# Patient Record
Sex: MALE | Race: WHITE | NOT HISPANIC OR LATINO | Employment: UNEMPLOYED | URBAN - NONMETROPOLITAN AREA
[De-identification: names, ages, dates, MRNs, and addresses within clinical notes are randomized per-mention and may not be internally consistent; named-entity substitution may affect disease eponyms.]

---

## 2017-01-18 ENCOUNTER — OFFICE VISIT (OUTPATIENT)
Dept: FAMILY MEDICINE CLINIC | Facility: CLINIC | Age: 32
End: 2017-01-18

## 2017-01-18 VITALS
WEIGHT: 153 LBS | DIASTOLIC BLOOD PRESSURE: 68 MMHG | BODY MASS INDEX: 25.49 KG/M2 | HEIGHT: 65 IN | SYSTOLIC BLOOD PRESSURE: 108 MMHG

## 2017-01-18 DIAGNOSIS — R06.83 SNORING: ICD-10-CM

## 2017-01-18 DIAGNOSIS — M79.605 CHRONIC PAIN OF BOTH LOWER EXTREMITIES: Primary | ICD-10-CM

## 2017-01-18 DIAGNOSIS — J06.9 VIRAL URI WITH COUGH: ICD-10-CM

## 2017-01-18 DIAGNOSIS — G89.29 CHRONIC PAIN OF BOTH LOWER EXTREMITIES: Primary | ICD-10-CM

## 2017-01-18 DIAGNOSIS — M79.604 CHRONIC PAIN OF BOTH LOWER EXTREMITIES: Primary | ICD-10-CM

## 2017-01-18 PROCEDURE — 99214 OFFICE O/P EST MOD 30 MIN: CPT | Performed by: FAMILY MEDICINE

## 2017-01-18 RX ORDER — BENZONATATE 100 MG/1
100 CAPSULE ORAL 3 TIMES DAILY PRN
Qty: 21 CAPSULE | Refills: 0 | Status: SHIPPED | OUTPATIENT
Start: 2017-01-18 | End: 2017-04-18

## 2017-01-18 RX ORDER — TRAMADOL HYDROCHLORIDE 50 MG/1
50 TABLET ORAL 3 TIMES DAILY PRN
Qty: 90 TABLET | Refills: 2 | Status: SHIPPED | OUTPATIENT
Start: 2017-01-18 | End: 2017-06-21

## 2017-01-18 RX ORDER — FLUTICASONE PROPIONATE 50 MCG
2 SPRAY, SUSPENSION (ML) NASAL DAILY
Qty: 1 EACH | Refills: 0 | Status: SHIPPED | OUTPATIENT
Start: 2017-01-18 | End: 2017-02-17

## 2017-01-18 RX ORDER — ASPIRIN 81 MG/1
81 TABLET, CHEWABLE ORAL DAILY
COMMUNITY
End: 2017-08-17 | Stop reason: HOSPADM

## 2017-01-18 RX ORDER — ALBUTEROL SULFATE 90 UG/1
2 AEROSOL, METERED RESPIRATORY (INHALATION) EVERY 6 HOURS PRN
Qty: 1 INHALER | Refills: 2 | Status: SHIPPED | OUTPATIENT
Start: 2017-01-18 | End: 2017-08-25

## 2017-01-18 NOTE — PROGRESS NOTES
Subjective   Giacomo Yanez is a 31 y.o. male.     History of Present Illness   Mr. Yanez is a 30yo male that presents today for follow-up on chronic leg pain.  He has seen orthopedic that wants to do surgery but he needs clearance.  EKG abnormal. Missed his appointment with cardiology.  He has been taking tramadol and that seems to be helping his pain a little and helps him.  His wife is concerned that he may have sleep apnea.  Wants to have a sleep study before surgery.  He has snoring problem.  Feels tired all the time.  Never well rested.  Wakes up all night for no reason.  He has had a nonproductive cough.  Has had rhinorrhea and clear drainage.  Hasn't had any fevers.        Current Outpatient Prescriptions:   •  albuterol (PROVENTIL HFA;VENTOLIN HFA) 108 (90 BASE) MCG/ACT inhaler, Inhale 2 puffs Every 6 (Six) Hours As Needed for wheezing., Disp: 1 inhaler, Rfl: 2  •  aspirin 81 MG chewable tablet, Chew 81 mg Daily., Disp: , Rfl:   •  ranitidine (ZANTAC) 150 MG capsule, Take 1 capsule by mouth every evening., Disp: 30 capsule, Rfl: 11  •  traMADol (ULTRAM) 50 MG tablet, Take 1 tablet by mouth 3 (three) times a day as needed for moderate pain (4-6)., Disp: 90 tablet, Rfl: 2    The following portions of the patient's history were reviewed and updated as appropriate: allergies, current medications, past family history, past medical history, past social history, past surgical history and problem list.    Review of Systems   Constitutional: Positive for activity change, appetite change and fatigue. Negative for unexpected weight change.   Respiratory: Negative for cough, chest tightness, shortness of breath, wheezing and stridor.    Cardiovascular: Negative for chest pain, palpitations and leg swelling.   Gastrointestinal: Negative for abdominal distention, constipation, diarrhea, nausea and vomiting.   Musculoskeletal: Positive for arthralgias, back pain and gait problem.   Skin: Negative for pallor, rash and  "wound.   Psychiatric/Behavioral: Positive for sleep disturbance. Negative for dysphoric mood. The patient is not nervous/anxious.        Objective    Vitals:    01/18/17 1316   BP: 108/68   Weight: 153 lb (69.4 kg)   Height: 65\" (165.1 cm)     Physical Exam   Constitutional: He is oriented to person, place, and time. He appears well-developed and well-nourished. No distress.   Cardiovascular: Normal rate, regular rhythm and normal heart sounds.    No murmur heard.  No LE edema.   Pulmonary/Chest: Effort normal and breath sounds normal. No respiratory distress.   Abdominal: Soft. Bowel sounds are normal. He exhibits no distension. There is no tenderness.   Neurological: He is alert and oriented to person, place, and time.   Psychiatric: He has a normal mood and affect. His behavior is normal. Judgment and thought content normal.   Nursing note and vitals reviewed.      Assessment/Plan   Problems Addressed this Visit     None      Visit Diagnoses     Chronic pain of both lower extremities    -  Primary    Relevant Medications    traMADol (ULTRAM) 50 MG tablet    Snoring        Relevant Orders    Ambulatory Referral to Sleep Medicine    Viral URI with cough        Relevant Medications    fluticasone (FLONASE) 50 MCG/ACT nasal spray    benzonatate (TESSALON PERLES) 100 MG capsule        1.) Back Pain-  Continue tramadol. Needs to get his cardiac clearance.  Will reschedule.  UDS at next appointment. The patient has read and signed the [facility] Controlled Substance Contract.  I will continue to see patient for regular follow up appointments.  They are well controlled on their medication.  NAHID has been reviewed by me and is updated every 3 months. The patient is aware of the potential for addiction and dependence.  2.) Snoring-  Refererd to sleep medicine for DEB evaluation.  3.) Viral URI-  Treat symptoms and start flonase.  RTC in 3 months or sooner PRN           "

## 2017-01-18 NOTE — MR AVS SNAPSHOT
Giacomo Yanez   1/18/2017 2:45 PM   Office Visit    Dept Phone:  [phone]   Encounter #:  [id number]    Provider:  Simran Rosas MD   Department:  [facility]                Your Full Care Plan              Today's Medication Changes          These changes are accurate as of: 1/18/17  2:21 PM.  If you have any questions, ask your nurse or doctor.               New Medication(s)Ordered:     benzonatate 100 MG capsule   Commonly known as:  TESSALON PERLES   Take 1 capsule by mouth 3 (Three) Times a Day As Needed for cough.   Started by:  Simran Rosas MD       fluticasone 50 MCG/ACT nasal spray   Commonly known as:  FLONASE   2 sprays into each nostril Daily for 30 days.   Started by:  Simran Rosas MD         Medication(s)that have changed:     albuterol 108 (90 BASE) MCG/ACT inhaler   Commonly known as:  PROVENTIL HFA;VENTOLIN HFA   Inhale 2 puffs Every 6 (Six) Hours As Needed for wheezing.   What changed:  reasons to take this   Changed by:  Simran Rosas MD            Where to Get Your Medications      These medications were sent to [address] - [phone] [phone] [place] [id number]     Phone:  [phone]     albuterol 108 (90 BASE) MCG/ACT inhaler    benzonatate 100 MG capsule    fluticasone 50 MCG/ACT nasal spray         You can get these medications from any pharmacy     Bring a paper prescription for each of these medications     traMADol 50 MG tablet                  Your Updated Medication List          This list is accurate as of: 1/18/17  2:21 PM.  Always use your most recent med list.                albuterol 108 (90 BASE) MCG/ACT inhaler   Commonly known as:  PROVENTIL HFA;VENTOLIN HFA   Inhale 2 puffs Every 6 (Six) Hours As Needed for wheezing.       aspirin 81 MG chewable tablet       benzonatate 100 MG capsule    Commonly known as:  TESSALON PERLES   Take 1 capsule by mouth 3 (Three) Times a Day As Needed for cough.       fluticasone 50 MCG/ACT nasal spray   Commonly known as:  FLONASE   2 sprays into each nostril Daily for 30 days.       ranitidine 150 MG capsule   Commonly known as:  ZANTAC   Take 1 capsule by mouth every evening.       traMADol 50 MG tablet   Commonly known as:  ULTRAM   Take 1 tablet by mouth 3 (Three) Times a Day As Needed for moderate pain (4-6).               We Performed the Following     Ambulatory Referral to Sleep Medicine       You Were Diagnosed With        Codes Comments    Chronic pain of both lower extremities    -  Primary ICD-10-CM: M79.604, M79.605, G89.29  ICD-9-CM: 729.5, 338.29     Snoring     ICD-10-CM: R06.83  ICD-9-CM: 786.09     Viral URI with cough     ICD-10-CM: J06.9, B97.89  ICD-9-CM: 465.9       Instructions     None    Patient Instructions History      Upcoming Appointments     Visit Type Date Time Department    OFFICE VISIT 2017  2:45 PM [facility]    OFFICE VISIT 2017  1:30 PM [facility]      [facility]     [facility] bMenu allows you to send messages to your doctor, view your test results, renew your prescriptions, schedule appointments, and more. To sign up, go to [place] and click on the Sign Up Now link in the New User? box. Enter your bMenu Activation Code exactly as it appears below along with the last four digits of your Social Security Number and your Date of Birth () to complete the sign-up process. If you do not sign up before the expiration date, you must request a new code.    bMenu Activation Code: [email or web address]  Expires: 2017  2:21 PM    If you have questions, you can email [email or web address] or call [phone] to talk to our bMenu staff. Remember, bMenu is NOT to be used for urgent needs. For medical emergencies, dial 911.               Other Info from Your Visit          "  Your Appointments     Jan 18, 2017  2:45 PM CST   Office Visit with Simran Rosas MD   [facility] (--)    [address]  [address] [place] [id number] [phone]           Arrive 15 minutes prior to appointment.            Apr 18, 2017  1:30 PM CDT   Office Visit with Simran Rosas MD   [facility] (--)    [address]  [address] [place] [id number] [phone]           Arrive 15 minutes prior to appointment.              Allergies     No Known Allergies      Reason for Visit     Follow-up chronic leg pain      Vital Signs     Blood Pressure Height Weight Body Mass Index Smoking Status       108/68 65\" (165.1 cm) 153 lb (69.4 kg) 25.46 kg/m2 Never Smoker       Problems and Diagnoses Noted     Chronic pain of both lower extremities    -  Primary    Snoring        Viral upper respiratory tract infection            "

## 2017-02-23 DIAGNOSIS — R94.31 ABNORMAL ELECTROCARDIOGRAM (ECG) (EKG): Primary | ICD-10-CM

## 2017-03-01 ENCOUNTER — HOSPITAL ENCOUNTER (EMERGENCY)
Facility: HOSPITAL | Age: 32
Discharge: HOME | End: 2017-03-02
Attending: EMERGENCY MEDICINE | Admitting: EMERGENCY MEDICINE

## 2017-03-01 DIAGNOSIS — J40 BRONCHITIS: Primary | ICD-10-CM

## 2017-03-01 LAB
BACTERIA UR QL AUTO: ABNORMAL /HPF
BILIRUB UR QL STRIP: ABNORMAL
CLARITY UR: CLEAR
COLOR UR: ABNORMAL
GLUCOSE UR STRIP-MCNC: NEGATIVE MG/DL
HGB UR QL STRIP.AUTO: ABNORMAL
HYALINE CASTS UR QL AUTO: ABNORMAL /LPF
KETONES UR QL STRIP: NEGATIVE
LEUKOCYTE ESTERASE UR QL STRIP.AUTO: NEGATIVE
NITRITE UR QL STRIP: NEGATIVE
PH UR STRIP.AUTO: 5.5 [PH] (ref 5–9)
PROT UR QL STRIP: ABNORMAL
RBC # UR: ABNORMAL /HPF
REF LAB TEST METHOD: ABNORMAL
SP GR UR STRIP: 1.03 (ref 1–1.03)
SQUAMOUS #/AREA URNS HPF: ABNORMAL /HPF
UROBILINOGEN UR QL STRIP: ABNORMAL
WBC UR QL AUTO: ABNORMAL /HPF

## 2017-03-01 PROCEDURE — 96361 HYDRATE IV INFUSION ADD-ON: CPT

## 2017-03-01 PROCEDURE — 80053 COMPREHEN METABOLIC PANEL: CPT | Performed by: EMERGENCY MEDICINE

## 2017-03-01 PROCEDURE — 96374 THER/PROPH/DIAG INJ IV PUSH: CPT

## 2017-03-01 PROCEDURE — 81001 URINALYSIS AUTO W/SCOPE: CPT | Performed by: EMERGENCY MEDICINE

## 2017-03-01 PROCEDURE — 85025 COMPLETE CBC W/AUTO DIFF WBC: CPT | Performed by: EMERGENCY MEDICINE

## 2017-03-01 PROCEDURE — 99283 EMERGENCY DEPT VISIT LOW MDM: CPT

## 2017-03-01 PROCEDURE — 87086 URINE CULTURE/COLONY COUNT: CPT | Performed by: EMERGENCY MEDICINE

## 2017-03-02 ENCOUNTER — APPOINTMENT (OUTPATIENT)
Dept: GENERAL RADIOLOGY | Facility: HOSPITAL | Age: 32
End: 2017-03-02

## 2017-03-02 VITALS
TEMPERATURE: 97.5 F | OXYGEN SATURATION: 97 % | BODY MASS INDEX: 26.99 KG/M2 | DIASTOLIC BLOOD PRESSURE: 64 MMHG | HEIGHT: 65 IN | RESPIRATION RATE: 18 BRPM | HEART RATE: 109 BPM | SYSTOLIC BLOOD PRESSURE: 119 MMHG | WEIGHT: 162 LBS

## 2017-03-02 LAB
ALBUMIN SERPL-MCNC: 3.9 G/DL (ref 3.4–4.8)
ALBUMIN/GLOB SERPL: 1.3 G/DL (ref 1.1–1.8)
ALP SERPL-CCNC: 110 U/L (ref 38–126)
ALT SERPL W P-5'-P-CCNC: 35 U/L (ref 21–72)
ANION GAP SERPL CALCULATED.3IONS-SCNC: 9 MMOL/L (ref 5–15)
AST SERPL-CCNC: 19 U/L (ref 17–59)
BASOPHILS # BLD AUTO: 0.01 10*3/MM3 (ref 0–0.2)
BASOPHILS NFR BLD AUTO: 0.1 % (ref 0–2)
BILIRUB SERPL-MCNC: 0.4 MG/DL (ref 0.2–1.3)
BUN BLD-MCNC: 14 MG/DL (ref 7–21)
BUN/CREAT SERPL: 12.3 (ref 7–25)
CALCIUM SPEC-SCNC: 8.8 MG/DL (ref 8.4–10.2)
CHLORIDE SERPL-SCNC: 103 MMOL/L (ref 95–110)
CO2 SERPL-SCNC: 29 MMOL/L (ref 22–31)
CREAT BLD-MCNC: 1.14 MG/DL (ref 0.7–1.3)
DEPRECATED RDW RBC AUTO: 41.6 FL (ref 35.1–43.9)
EOSINOPHIL # BLD AUTO: 0.16 10*3/MM3 (ref 0–0.7)
EOSINOPHIL NFR BLD AUTO: 1.9 % (ref 0–7)
ERYTHROCYTE [DISTWIDTH] IN BLOOD BY AUTOMATED COUNT: 13.3 % (ref 11.5–14.5)
GFR SERPL CREATININE-BSD FRML MDRD: 74 ML/MIN/1.73 (ref 70–162)
GLOBULIN UR ELPH-MCNC: 3.1 GM/DL (ref 2.3–3.5)
GLUCOSE BLD-MCNC: 110 MG/DL (ref 60–100)
HCT VFR BLD AUTO: 41.2 % (ref 39–49)
HGB BLD-MCNC: 13.9 G/DL (ref 13.7–17.3)
HOLD SPECIMEN: NORMAL
HOLD SPECIMEN: NORMAL
IMM GRANULOCYTES # BLD: 0.02 10*3/MM3 (ref 0–0.02)
IMM GRANULOCYTES NFR BLD: 0.2 % (ref 0–0.5)
LYMPHOCYTES # BLD AUTO: 2.07 10*3/MM3 (ref 0.6–4.2)
LYMPHOCYTES NFR BLD AUTO: 24.1 % (ref 10–50)
MCH RBC QN AUTO: 28.7 PG (ref 26.5–34)
MCHC RBC AUTO-ENTMCNC: 33.7 G/DL (ref 31.5–36.3)
MCV RBC AUTO: 85.1 FL (ref 80–98)
MONOCYTES # BLD AUTO: 0.7 10*3/MM3 (ref 0–0.9)
MONOCYTES NFR BLD AUTO: 8.1 % (ref 0–12)
NEUTROPHILS # BLD AUTO: 5.63 10*3/MM3 (ref 2–8.6)
NEUTROPHILS NFR BLD AUTO: 65.6 % (ref 37–80)
PLATELET # BLD AUTO: 231 10*3/MM3 (ref 150–450)
PMV BLD AUTO: 11 FL (ref 8–12)
POTASSIUM BLD-SCNC: 4.1 MMOL/L (ref 3.5–5.1)
PROT SERPL-MCNC: 7 G/DL (ref 6.3–8.6)
RBC # BLD AUTO: 4.84 10*6/MM3 (ref 4.37–5.74)
SODIUM BLD-SCNC: 141 MMOL/L (ref 137–145)
WBC NRBC COR # BLD: 8.59 10*3/MM3 (ref 3.2–9.8)
WHOLE BLOOD HOLD SPECIMEN: NORMAL
WHOLE BLOOD HOLD SPECIMEN: NORMAL

## 2017-03-02 PROCEDURE — 25010000002 ONDANSETRON PER 1 MG: Performed by: EMERGENCY MEDICINE

## 2017-03-02 PROCEDURE — 96374 THER/PROPH/DIAG INJ IV PUSH: CPT

## 2017-03-02 PROCEDURE — 71020 HC CHEST PA AND LATERAL: CPT

## 2017-03-02 PROCEDURE — 63710000001 PREDNISONE PER 5 MG: Performed by: EMERGENCY MEDICINE

## 2017-03-02 PROCEDURE — 96361 HYDRATE IV INFUSION ADD-ON: CPT

## 2017-03-02 RX ORDER — IPRATROPIUM BROMIDE AND ALBUTEROL SULFATE 2.5; .5 MG/3ML; MG/3ML
3 SOLUTION RESPIRATORY (INHALATION) ONCE
Status: COMPLETED | OUTPATIENT
Start: 2017-03-02 | End: 2017-03-02

## 2017-03-02 RX ORDER — PREDNISONE 20 MG/1
60 TABLET ORAL DAILY
Qty: 15 TABLET | Refills: 0 | Status: SHIPPED | OUTPATIENT
Start: 2017-03-02 | End: 2017-04-18

## 2017-03-02 RX ORDER — PREDNISONE 20 MG/1
60 TABLET ORAL ONCE
Status: COMPLETED | OUTPATIENT
Start: 2017-03-02 | End: 2017-03-02

## 2017-03-02 RX ORDER — ONDANSETRON 2 MG/ML
4 INJECTION INTRAMUSCULAR; INTRAVENOUS ONCE
Status: COMPLETED | OUTPATIENT
Start: 2017-03-02 | End: 2017-03-02

## 2017-03-02 RX ORDER — AZITHROMYCIN 250 MG/1
250 TABLET, FILM COATED ORAL DAILY
Qty: 4 TABLET | Refills: 0 | Status: SHIPPED | OUTPATIENT
Start: 2017-03-02 | End: 2017-03-06

## 2017-03-02 RX ORDER — AZITHROMYCIN 250 MG/1
500 TABLET, FILM COATED ORAL ONCE
Status: COMPLETED | OUTPATIENT
Start: 2017-03-02 | End: 2017-03-02

## 2017-03-02 RX ADMIN — IPRATROPIUM BROMIDE AND ALBUTEROL SULFATE 3 ML: .5; 3 SOLUTION RESPIRATORY (INHALATION) at 01:21

## 2017-03-02 RX ADMIN — SODIUM CHLORIDE 1000 ML: 9 INJECTION, SOLUTION INTRAVENOUS at 01:21

## 2017-03-02 RX ADMIN — ONDANSETRON 4 MG: 2 INJECTION, SOLUTION INTRAMUSCULAR; INTRAVENOUS at 01:21

## 2017-03-02 RX ADMIN — PREDNISONE 60 MG: 20 TABLET ORAL at 01:21

## 2017-03-02 RX ADMIN — AZITHROMYCIN 500 MG: 250 TABLET, FILM COATED ORAL at 02:13

## 2017-03-02 NOTE — ED PROVIDER NOTES
Subjective   Patient is a 31 y.o. male presenting with cough.   History provided by:  Patient  Cough   Cough characteristics:  Productive  Sputum characteristics:  Green and yellow  Severity:  Moderate  Onset quality:  Gradual  Duration:  2 weeks  Timing:  Constant  Progression:  Worsening  Chronicity:  New  Smoker: no    Relieved by:  Nothing  Worsened by:  Nothing  Ineffective treatments:  Beta-agonist inhaler and cough suppressants  Associated symptoms: ear fullness, ear pain and sore throat    Associated symptoms: no chest pain, no chills, no fever, no headaches, no rash, no rhinorrhea, no shortness of breath and no wheezing    Ear pain:     Location:  Bilateral    Severity:  Mild    Onset quality:  Gradual    Timing:  Intermittent    Progression:  Waxing and waning  Sore throat:     Onset quality:  Gradual    Timing:  Intermittent    Progression:  Waxing and waning      Review of Systems   Constitutional: Negative for activity change, chills and fever.   HENT: Positive for ear pain and sore throat. Negative for congestion, facial swelling, rhinorrhea and sinus pressure.    Eyes: Negative for photophobia and visual disturbance.   Respiratory: Positive for cough. Negative for chest tightness, shortness of breath and wheezing.    Cardiovascular: Negative for chest pain.   Gastrointestinal: Negative for abdominal distention, abdominal pain, diarrhea, nausea and vomiting.   Endocrine: Negative for polyuria.   Genitourinary: Negative for flank pain.   Musculoskeletal: Negative for back pain, joint swelling and neck pain.   Skin: Negative for rash.   Neurological: Negative for seizures, numbness and headaches.   Hematological: Negative for adenopathy.   Psychiatric/Behavioral: Negative for agitation.       Past Medical History   Diagnosis Date   • Astigmatism    • Bone disease    • Chronic pain    • Fibroma of lower extremity    • Myopia        No Known Allergies    History reviewed. No pertinent past surgical  history.    History reviewed. No pertinent family history.    Social History     Social History   • Marital status:      Spouse name: N/A   • Number of children: N/A   • Years of education: N/A     Social History Main Topics   • Smoking status: Never Smoker   • Smokeless tobacco: Never Used   • Alcohol use No   • Drug use: No   • Sexual activity: Yes     Partners: Female     Other Topics Concern   • None     Social History Narrative   • None           Objective   Physical Exam   Constitutional: He is oriented to person, place, and time. He appears well-developed and well-nourished. No distress.   HENT:   Head: Normocephalic and atraumatic.   Eyes: Conjunctivae and EOM are normal. Pupils are equal, round, and reactive to light.   Neck: Normal range of motion. Neck supple.   Cardiovascular: Normal rate, regular rhythm and normal heart sounds.    Pulmonary/Chest: Effort normal and breath sounds normal. No respiratory distress. He has no wheezes.   Abdominal: Soft. Bowel sounds are normal. He exhibits no distension. There is no tenderness. There is no rebound and no guarding.   Musculoskeletal: Normal range of motion. He exhibits no edema or deformity.   Neurological: He is alert and oriented to person, place, and time.   Skin: Skin is warm and dry. No rash noted.   Psychiatric: He has a normal mood and affect.   Nursing note and vitals reviewed.      Procedures         ED Course  ED Course   Comment By Time   Is given breathing treatment and prednisone in here.  He has negative white count.  Unremarkable chemistry profile.  Chest x-ray is negative for any focal consolidation/pneumonia.  Since this is going on for almost 2 weeks he is given a dose of Zithromax in here.  We'll continue with albuterol inhaler and prednisone to go home with.  I do not think he needs any further workup and can be discharged. Lopez Horn MD 03/02 7753          Labs Reviewed   URINALYSIS W/ CULTURE IF INDICATED - Abnormal; Notable for  the following:        Result Value    Specific Gravity, UA 1.032 (*)     Bilirubin, UA Small (1+) (*)     Blood, UA Trace (*)     Protein, UA Trace (*)     All other components within normal limits   URINALYSIS, MICROSCOPIC ONLY - Abnormal; Notable for the following:     RBC, UA 3-5 (*)     WBC, UA 6-12 (*)     All other components within normal limits   COMPREHENSIVE METABOLIC PANEL - Abnormal; Notable for the following:     Glucose 110 (*)     All other components within normal limits   URINE CULTURE - Normal   CBC WITH AUTO DIFFERENTIAL - Normal   RAINBOW DRAW    Narrative:     The following orders were created for panel order [place] Draw.  Procedure                               Abnormality         Status                     ---------                               -----------         ------                     Light Blue Top[[id number]]                                    Final result               Green Top (Gel)[[id number]]                                   Final result               Lavender Top[[id number]]                                      Final result               Gold Top - SST[[id number]]                                    Final result                 Please view results for these tests on the individual orders.   LIGHT BLUE TOP   GREEN TOP   LAVENDER TOP   GOLD TOP - SST   CBC AND DIFFERENTIAL    Narrative:     The following orders were created for panel order CBC & Differential.  Procedure                               Abnormality         Status                     ---------                               -----------         ------                     CBC Auto Differential[[id number]]         Normal              Final result                 Please view results for these tests on the individual orders.       Xr Chest 2 View    Result Date: 3/2/2017  Narrative: Exam: PA lateral chest INDICATION: Cough COMPARISON: 8/14/2016 FINDINGS: PA lateral chest. Deformity of the left humerus without change. The  cardiomediastinal silhouette is unremarkable. Lungs are clear. No pneumothorax or pleural effusion.     Impression: No acute cardiopulmonary abnormality. Electronically signed by:  Saleem Escobar MD  3/2/2017 1:03 AM [facility] Workstation: [id number]              [facility]    Final diagnoses:   Bronchitis            Lopez Horn MD  03/05/17 0046

## 2017-03-03 LAB — BACTERIA SPEC AEROBE CULT: NORMAL

## 2017-04-18 ENCOUNTER — OFFICE VISIT (OUTPATIENT)
Dept: FAMILY MEDICINE CLINIC | Facility: CLINIC | Age: 32
End: 2017-04-18

## 2017-04-18 VITALS
WEIGHT: 156.9 LBS | BODY MASS INDEX: 26.14 KG/M2 | DIASTOLIC BLOOD PRESSURE: 68 MMHG | SYSTOLIC BLOOD PRESSURE: 100 MMHG | HEIGHT: 65 IN

## 2017-04-18 DIAGNOSIS — M79.605 CHRONIC PAIN OF BOTH LOWER EXTREMITIES: ICD-10-CM

## 2017-04-18 DIAGNOSIS — G89.29 CHRONIC PAIN OF BOTH LOWER EXTREMITIES: ICD-10-CM

## 2017-04-18 DIAGNOSIS — R94.31 ABNORMAL EKG: ICD-10-CM

## 2017-04-18 DIAGNOSIS — M79.604 CHRONIC PAIN OF BOTH LOWER EXTREMITIES: ICD-10-CM

## 2017-04-18 DIAGNOSIS — D21.21 FIBROMA OF LOWER EXTREMITY, RIGHT: Primary | ICD-10-CM

## 2017-04-18 PROCEDURE — 99213 OFFICE O/P EST LOW 20 MIN: CPT | Performed by: FAMILY MEDICINE

## 2017-04-18 NOTE — PROGRESS NOTES
Subjective   Giacomo Yanez is a 31 y.o. male.     History of Present Illness   Mr. Yanez is a 30yo male that presents today for follow-up on chronic leg pain. He was supposed to have surgery, but never went to his appointment with cardiology because he missed and he is too scared. He has a new spot on the other leg that is hurting a lot. Pain has been bad enough that he has been crying at night and unable to sleep at times.  He has been taking tramadol and isn't taking it all the time. Only takes it 1-2 times a day.        Current Outpatient Prescriptions:   •  albuterol (PROVENTIL HFA;VENTOLIN HFA) 108 (90 BASE) MCG/ACT inhaler, Inhale 2 puffs Every 6 (Six) Hours As Needed for wheezing., Disp: 1 inhaler, Rfl: 2  •  aspirin 81 MG chewable tablet, Chew 81 mg Daily., Disp: , Rfl:   •  ranitidine (ZANTAC) 150 MG capsule, Take 1 capsule by mouth every evening., Disp: 30 capsule, Rfl: 11  •  traMADol (ULTRAM) 50 MG tablet, Take 1 tablet by mouth 3 (Three) Times a Day As Needed for moderate pain (4-6)., Disp: 90 tablet, Rfl: 2    The following portions of the patient's history were reviewed and updated as appropriate: allergies, current medications, past family history, past medical history, past social history, past surgical history and problem list.    Review of Systems   Constitutional: Positive for activity change and fatigue. Negative for appetite change and unexpected weight change.   Respiratory: Negative for cough, chest tightness, shortness of breath, wheezing and stridor.    Cardiovascular: Negative for chest pain, palpitations and leg swelling.   Gastrointestinal: Negative for abdominal distention, constipation, diarrhea, nausea and vomiting.   Musculoskeletal: Positive for arthralgias, back pain and gait problem.   Skin: Negative for pallor, rash and wound.   Psychiatric/Behavioral: Positive for sleep disturbance. Negative for dysphoric mood. The patient is not nervous/anxious.        Objective    Vitals:  "   04/18/17 1319   BP: 100/68   Weight: 156 lb 14.4 oz (71.2 kg)   Height: 65\" (165.1 cm)     Physical Exam   Constitutional: He is oriented to person, place, and time. He appears well-developed and well-nourished. No distress.   Cardiovascular: Normal rate, regular rhythm and normal heart sounds.    No murmur heard.  No LE edema.   Pulmonary/Chest: Effort normal and breath sounds normal. No respiratory distress.   Abdominal: Soft. Bowel sounds are normal. He exhibits no distension. There is no tenderness.   Musculoskeletal:   New palpable bony lesion on the lateral lower aspect of his left femur.   Neurological: He is alert and oriented to person, place, and time.   Psychiatric: He has a normal mood and affect. His behavior is normal. Judgment and thought content normal.   Nursing note and vitals reviewed.      Assessment/Plan   Problems Addressed this Visit        Other    Fibroma of lower extremity - Primary    Relevant Orders    Ambulatory Referral to Orthopedic Surgery      Other Visit Diagnoses     Chronic pain of both lower extremities        Abnormal EKG        Relevant Orders    Ambulatory Referral to Cardiology        He is doing well with tramadol and doesn't take it often. Doesn't need a refill.  UDS in chart appropriate.  The patient has read and signed the [facility] Controlled Substance Contract.  I will continue to see patient for regular follow up appointments.  They are well controlled on their medication.  NAHID has been reviewed by me and is updated every 3 months. The patient is aware of the potential for addiction and dependence.  WIll refer back to Dr. Fonseca for evaluation of new lesion.  Also referred to cardiologist in [place] per his request.  Stressed that he needs to go to that appointment for cardiac clearance.  RTC in 2-3 months or sooner PRN           "

## 2017-06-21 ENCOUNTER — OFFICE VISIT (OUTPATIENT)
Dept: FAMILY MEDICINE CLINIC | Facility: CLINIC | Age: 32
End: 2017-06-21

## 2017-06-21 VITALS
BODY MASS INDEX: 24.79 KG/M2 | SYSTOLIC BLOOD PRESSURE: 92 MMHG | HEIGHT: 65 IN | WEIGHT: 148.8 LBS | DIASTOLIC BLOOD PRESSURE: 68 MMHG

## 2017-06-21 DIAGNOSIS — G89.4 CHRONIC PAIN SYNDROME: Primary | ICD-10-CM

## 2017-06-21 PROCEDURE — 99213 OFFICE O/P EST LOW 20 MIN: CPT | Performed by: FAMILY MEDICINE

## 2017-06-21 NOTE — PROGRESS NOTES
"Subjective   Giacomomariela Yanez is a 31 y.o. male.     History of Present Illness   Mr. Yanez is a 30yo male that presents today for follow-up on his chronic leg pain.  He recently had a surgery to remove his fibromas. He has been doing well. Has not even taken iburpofen for pain.  He is happy with results and had no complications from the surgery. Has been walking and ambulating well. Has to have the procedure on the other side.        Current Outpatient Prescriptions:   •  albuterol (PROVENTIL HFA;VENTOLIN HFA) 108 (90 BASE) MCG/ACT inhaler, Inhale 2 puffs Every 6 (Six) Hours As Needed for wheezing., Disp: 1 inhaler, Rfl: 2  •  aspirin 81 MG chewable tablet, Chew 81 mg Daily., Disp: , Rfl:   •  ranitidine (ZANTAC) 150 MG capsule, Take 1 capsule by mouth every evening., Disp: 30 capsule, Rfl: 11    The following portions of the patient's history were reviewed and updated as appropriate: allergies, current medications, past family history, past medical history, past social history, past surgical history and problem list.    Review of Systems   Constitutional: Negative for activity change, appetite change, fatigue and unexpected weight change.   Eyes: Negative for visual disturbance.   Respiratory: Negative for cough, shortness of breath and wheezing.    Cardiovascular: Negative for chest pain, palpitations and leg swelling.   Gastrointestinal: Negative for abdominal distention, abdominal pain, constipation, diarrhea, nausea and vomiting.   Musculoskeletal: Negative for arthralgias, back pain, gait problem and joint swelling.   Skin: Negative for pallor, rash and wound.   Neurological: Negative for headaches.   Psychiatric/Behavioral: Negative for dysphoric mood and sleep disturbance. The patient is not nervous/anxious.        Objective    Vitals:    06/21/17 0958   BP: 92/68   Weight: 148 lb 12.8 oz (67.5 kg)   Height: 65\" (165.1 cm)       Physical Exam   Constitutional: He is oriented to person, place, and time. He " appears well-developed and well-nourished. No distress.   Cardiovascular: Normal rate, regular rhythm and normal heart sounds.    No murmur heard.  No LE edema.   Pulmonary/Chest: Effort normal and breath sounds normal. No respiratory distress.   Abdominal: Soft. Bowel sounds are normal. He exhibits no distension. There is no tenderness.   Neurological: He is alert and oriented to person, place, and time.   Psychiatric: He has a normal mood and affect. His behavior is normal. Judgment and thought content normal.   Nursing note and vitals reviewed.      Assessment/Plan   Problems Addressed this Visit        Other    Chronic pain - Primary          He has been doing well since the surgery. Having no pain and not taking any pain medication.  Will follow-up with ortho next month to discuss the other leg.    RTC in 6 months or sooner PRN

## 2017-06-29 ENCOUNTER — OFFICE VISIT (OUTPATIENT)
Dept: OTOLARYNGOLOGY | Facility: CLINIC | Age: 32
End: 2017-06-29

## 2017-06-29 VITALS — WEIGHT: 152 LBS | TEMPERATURE: 98.1 F | HEIGHT: 65 IN | BODY MASS INDEX: 25.33 KG/M2

## 2017-06-29 DIAGNOSIS — H60.313 CHRONIC DIFFUSE OTITIS EXTERNA OF BOTH EARS: Primary | ICD-10-CM

## 2017-06-29 DIAGNOSIS — H61.20 IMPACTED CERUMEN, UNSPECIFIED LATERALITY: ICD-10-CM

## 2017-06-29 PROCEDURE — 99203 OFFICE O/P NEW LOW 30 MIN: CPT | Performed by: OTOLARYNGOLOGY

## 2017-06-29 PROCEDURE — 92504 EAR MICROSCOPY EXAMINATION: CPT | Performed by: OTOLARYNGOLOGY

## 2017-06-29 RX ORDER — HYDROCORTISONE AND ACETIC ACID 20.75; 10.375 MG/ML; MG/ML
4 SOLUTION AURICULAR (OTIC) 2 TIMES WEEKLY
Qty: 10 ML | Refills: 11 | Status: SHIPPED | OUTPATIENT
Start: 2017-06-29

## 2017-06-29 NOTE — PROGRESS NOTES
Subjective   Giacomo Yanez is a 31 y.o. male.   CC: hearing loss  History of Present Illness   He comes in with a long history of hearing problems.  He states that he has wax in his ears frequently in his girlfriend cleaned his ears out with Q-tips in sometimes her bleeding out of his ears.  Not having drainage not having a lot of pain he has hearing loss in both ears some tinnitus.  Is not describing dizziness or vertigo denies prior ear surgery.  She is disabled.    The following portions of the patient's history were reviewed and updated as appropriate: allergies, current medications, past family history, past medical history, past social history, past surgical history and problem list.      Giacomo Yanez reports that he has never smoked. He has never used smokeless tobacco. He reports that he does not drink alcohol or use illicit drugs.  Patient is not a tobacco user and has not been counseled for use of tobacco products    Family History   Problem Relation Age of Onset   • Thyroid disease Mother          Current Outpatient Prescriptions:   •  albuterol (PROVENTIL HFA;VENTOLIN HFA) 108 (90 BASE) MCG/ACT inhaler, Inhale 2 puffs Every 6 (Six) Hours As Needed for wheezing., Disp: 1 inhaler, Rfl: 2  •  aspirin 81 MG chewable tablet, Chew 81 mg Daily., Disp: , Rfl:   •  acetic acid-hydrocortisone (VOSOL-HC) 1-2 % otic solution, Administer 4 drops into ears 2 (Two) Times a Week., Disp: 10 mL, Rfl: 11      Past Medical History:   Diagnosis Date   • Astigmatism    • Bone disease    • Chronic pain    • Fibroma of lower extremity    • Myopia          Review of Systems   HENT: Positive for hearing loss.    Respiratory: Positive for shortness of breath.    Musculoskeletal:        Leg pain   Neurological: Negative for dizziness.   All other systems reviewed and are negative.          Objective   Physical Exam   Constitutional: He is oriented to person, place, and time. He appears well-developed and well-nourished.    HENT:   Head: Normocephalic and atraumatic.   Right Ear: Hearing and external ear normal. There is swelling and tenderness. Tympanic membrane is erythematous.   Left Ear: Hearing and external ear normal. There is swelling and tenderness. Tympanic membrane is erythematous.   Ears:    Nose: Nose normal. No mucosal edema, rhinorrhea, nasal deformity or septal deviation. No epistaxis. Right sinus exhibits no maxillary sinus tenderness and no frontal sinus tenderness. Left sinus exhibits no maxillary sinus tenderness and no frontal sinus tenderness.   Mouth/Throat: Uvula is midline, oropharynx is clear and moist and mucous membranes are normal. No trismus in the jaw. Abnormal dentition. Dental caries present. No oropharyngeal exudate or posterior oropharyngeal edema. No tonsillar exudate.   Eyes: Conjunctivae and EOM are normal. Pupils are equal, round, and reactive to light.   Neck: Normal range of motion. Neck supple. No JVD present. No tracheal deviation present. No thyromegaly present.   Cardiovascular: Normal rate and regular rhythm.    Pulmonary/Chest: Effort normal and breath sounds normal.   Musculoskeletal: Normal range of motion.   Lymphadenopathy:        Head (right side): No submental, no submandibular, no tonsillar, no preauricular, no posterior auricular and no occipital adenopathy present.        Head (left side): No submental, no submandibular, no tonsillar, no preauricular, no posterior auricular and no occipital adenopathy present.     He has no cervical adenopathy.        Right cervical: No superficial cervical, no deep cervical and no posterior cervical adenopathy present.       Left cervical: No superficial cervical, no deep cervical and no posterior cervical adenopathy present.   Neurological: He is alert and oriented to person, place, and time. No cranial nerve deficit.   Skin: Skin is warm.   Psychiatric: He has a normal mood and affect. His speech is normal and behavior is normal. Thought  content normal.   Nursing note and vitals reviewed.            Assessment/Plan   Giacomo was seen today for hearing loss.    Diagnoses and all orders for this visit:    Chronic diffuse otitis externa of both ears    Impacted cerumen, unspecified laterality    Other orders  -     acetic acid-hydrocortisone (VOSOL-HC) 1-2 % otic solution; Administer 4 drops into ears 2 (Two) Times a Week.    Is ears were cleaned thoroughly atraumatically with a microscope.  Right it well.  This critical need for not using Q-tips in his ears.  I placed him on antibiotic drops he is to keep his ears dry in follow-up in 3 weeks will recheck his hearing at that point.  As possible is hearing problems are primarily related to the severe cerumen impaction and otitis externa he has from traumatizes ear canals.  I don't think there is a  TM perforation though we'll reevaluate that on follow-up

## 2017-07-20 ENCOUNTER — CLINICAL SUPPORT (OUTPATIENT)
Dept: AUDIOLOGY | Facility: CLINIC | Age: 32
End: 2017-07-20

## 2017-07-20 ENCOUNTER — OFFICE VISIT (OUTPATIENT)
Dept: OTOLARYNGOLOGY | Facility: CLINIC | Age: 32
End: 2017-07-20

## 2017-07-20 VITALS — BODY MASS INDEX: 25.33 KG/M2 | HEIGHT: 65 IN | WEIGHT: 152 LBS | TEMPERATURE: 97.8 F

## 2017-07-20 DIAGNOSIS — H90.5 SENSORINEURAL HEARING LOSS OF LEFT EAR: Primary | ICD-10-CM

## 2017-07-20 DIAGNOSIS — H90.5 SENSORINEURAL HEARING LOSS OF LEFT EAR: ICD-10-CM

## 2017-07-20 DIAGNOSIS — IMO0001 ASYMMETRICAL HEARING LOSS OF LEFT EAR: ICD-10-CM

## 2017-07-20 DIAGNOSIS — H60.313 CHRONIC DIFFUSE OTITIS EXTERNA OF BOTH EARS: Primary | ICD-10-CM

## 2017-07-20 PROCEDURE — 99213 OFFICE O/P EST LOW 20 MIN: CPT | Performed by: OTOLARYNGOLOGY

## 2017-07-20 PROCEDURE — 92557 COMPREHENSIVE HEARING TEST: CPT | Performed by: AUDIOLOGIST

## 2017-07-20 PROCEDURE — 92567 TYMPANOMETRY: CPT | Performed by: AUDIOLOGIST

## 2017-07-20 NOTE — PROGRESS NOTES
Subjective   Giacomomariela Yanez is a 31 y.o. male.   Chief complaint: follow up otitis externa and cerumen impaction and hearing loss    History of Present Illness   She comes in follow-up C0 spell well he still has some hearing loss but is not having pain discomfort or drainage is concerned about hearing loss.  He does have a history of family history of his father having hearing loss since childhood patient does not know how long these had hearing loss.  He does have some tinnitus is not having vertigo.  7 no other neurologic complaints.  He has no prior hearing test compared to.  Denies significant noise exposure, head trauma, ear surgery, or   ototoxic drugs use      The following portions of the patient's history were reviewed and updated as appropriate: allergies, current medications, past family history, past medical history, past social history, past surgical history and problem list.      Review of Systems   HENT: Positive for hearing loss. Negative for ear discharge, ear pain and tinnitus.    Neurological: Negative for dizziness and facial asymmetry.           Objective   Physical Exam   Constitutional: He is oriented to person, place, and time. He appears well-developed and well-nourished.   HENT:   Head: Normocephalic and atraumatic.   Right Ear: Hearing, tympanic membrane, external ear and ear canal normal.   Left Ear: Hearing, tympanic membrane, external ear and ear canal normal.   Nose: Septal deviation present. No mucosal edema, rhinorrhea or nasal deformity. No epistaxis. Right sinus exhibits no maxillary sinus tenderness and no frontal sinus tenderness. Left sinus exhibits no maxillary sinus tenderness and no frontal sinus tenderness.   Mouth/Throat: Uvula is midline, oropharynx is clear and moist and mucous membranes are normal. No trismus in the jaw. Normal dentition. No oropharyngeal exudate or posterior oropharyngeal edema. No tonsillar exudate.   Eyes: Conjunctivae and EOM are normal. Pupils are  equal, round, and reactive to light.   Neck: Normal range of motion. Neck supple. No JVD present. No tracheal deviation present. No thyromegaly present.   Cardiovascular: Normal rate.    Pulmonary/Chest: Effort normal.   Musculoskeletal: Normal range of motion.   Lymphadenopathy:        Head (right side): No submental, no submandibular, no tonsillar, no preauricular, no posterior auricular and no occipital adenopathy present.        Head (left side): No submental, no submandibular, no tonsillar, no preauricular, no posterior auricular and no occipital adenopathy present.     He has no cervical adenopathy.        Right cervical: No superficial cervical, no deep cervical and no posterior cervical adenopathy present.       Left cervical: No superficial cervical, no deep cervical and no posterior cervical adenopathy present.   Neurological: He is alert and oriented to person, place, and time. No cranial nerve deficit.   Skin: Skin is warm.   Psychiatric: He has a normal mood and affect. His speech is normal and behavior is normal. Thought content normal.   Nursing note and vitals reviewed.      AudioGram was reviewed with the patient which reveals a mild to moderate relatively fats flat sensorineural hearing loss and abnormal tympanograms or significant asymmetry that he has good speech discrimination    Assessment/Plan   Giacomo was seen today for follow-up.    Diagnoses and all orders for this visit:    Chronic diffuse otitis externa of both ears    Sensorineural hearing loss of left ear  -     MRI Internal Auditory Canal With Contrast; Future    Asymmetrical hearing loss of left ear  -     MRI Internal Auditory Canal With Contrast; Future    Due to the asymmetry of his hearing loss MRIs been ordered.  I discussed some the pathophysiology is a causes for this.  We will see him in follow-up in 2-3 weeks after the MRI scan.  No known contraindications to an MRI and is had one before in another anatomic location.    Explained to him he may eventually need a hearing aid but we need to rule out more serious causes.  Original problem with a cerumen impaction and otitis externa has resolved he's to keep his ears dry and not use Q-tips any longer

## 2017-07-20 NOTE — PROGRESS NOTES
STANDARD AUDIOMETRIC EVALUATION      Name:  Giacomo Yanez  :  1985  Age:  31 y.o.  Date of Evaluation:  2017      HISTORY    Reason for visit:  Giacomo Yanez is seen today for a hearing evaluation at the request of Dr. Al Chau.  Patient reports that he is in for a 3 week recheck and is having trouble hearing in his left ear.      EVALUATION    See Audiogram    RESULTS        Otoscopy and Tympanometry 226 Hz :  Right Ear:  Otoscopy:  Clear ear canal          Tympanometry:  Middle ear function within normal limits    Left Ear:   Otoscopy:  Clear ear canal        Tympanometry:  Middle ear function within normal limits    Test technique:  Standard Audiometry     Pure Tone Audiometry:   Patient responded to pure tones at 5-15 dB for 250-8000 Hz in right ear, and at 35-55 dB for 250-8000 Hz in left ear.       Speech Audiometry:        Right Ear:  Speech Reception Threshold (SRT) was obtained at 10 dBHL                 Speech Discrimination scores were 100% in quiet when words were presented at 50 dBHL       Left Ear:  Speech Reception Threshold (SRT) was obtained at 35 dBHL                 Speech Discrimination scores were 100% in masking noise when words were presented at  70 dBHL    Reliability:   good    IMPRESSIONS:  1.  Tympanometry results are consistent with Middle ear function within normal limits in both ears.  2.  Pure tone results are consistent with hearing sensitivity within normal limits for right ear, and mild to moderate flat sensorineural hearing loss in left ear.       RECOMMENDATIONS:  Patient is seeing the Ear Nose and Throat physician immediately following this examination.  It was a pleasure seeing Giacomo Yanez in Audiology today.  We would be happy to do further testing or discuss these test as necessary.          This document has been electronically signed by EDSON Alvarez on 2017 4:55 PM          EDSON Alvarez  Licensed Audiologist

## 2017-07-27 ENCOUNTER — HOSPITAL ENCOUNTER (OUTPATIENT)
Dept: MRI IMAGING | Facility: HOSPITAL | Age: 32
Discharge: HOME | End: 2017-07-27
Admitting: OTOLARYNGOLOGY

## 2017-07-27 DIAGNOSIS — IMO0001 ASYMMETRICAL HEARING LOSS OF LEFT EAR: ICD-10-CM

## 2017-07-27 DIAGNOSIS — H90.5 SENSORINEURAL HEARING LOSS OF LEFT EAR: ICD-10-CM

## 2017-07-27 PROCEDURE — A9576 INJ PROHANCE MULTIPACK: HCPCS | Performed by: OTOLARYNGOLOGY

## 2017-07-27 PROCEDURE — 25010000002 GADOTERIDOL PER 1 ML: Performed by: OTOLARYNGOLOGY

## 2017-07-27 PROCEDURE — 70552 MRI BRAIN STEM W/DYE: CPT

## 2017-07-27 RX ADMIN — GADOTERIDOL 15 ML: 279.3 INJECTION, SOLUTION INTRAVENOUS at 10:36

## 2017-08-15 ENCOUNTER — APPOINTMENT (OUTPATIENT)
Dept: GENERAL RADIOLOGY | Facility: HOSPITAL | Age: 32
End: 2017-08-15

## 2017-08-15 ENCOUNTER — ANESTHESIA (OUTPATIENT)
Dept: PERIOP | Facility: HOSPITAL | Age: 32
End: 2017-08-15

## 2017-08-15 ENCOUNTER — PREP FOR SURGERY (OUTPATIENT)
Dept: OTHER | Facility: HOSPITAL | Age: 32
End: 2017-08-15

## 2017-08-15 ENCOUNTER — HOSPITAL ENCOUNTER (INPATIENT)
Facility: HOSPITAL | Age: 32
LOS: 1 days | Discharge: HOME | End: 2017-08-17
Attending: FAMILY MEDICINE | Admitting: SURGERY

## 2017-08-15 ENCOUNTER — APPOINTMENT (OUTPATIENT)
Dept: ULTRASOUND IMAGING | Facility: HOSPITAL | Age: 32
End: 2017-08-15

## 2017-08-15 ENCOUNTER — ANESTHESIA EVENT (OUTPATIENT)
Dept: PERIOP | Facility: HOSPITAL | Age: 32
End: 2017-08-15

## 2017-08-15 DIAGNOSIS — K80.00 CALCULUS OF GALLBLADDER WITH ACUTE CHOLECYSTITIS WITHOUT OBSTRUCTION: Primary | ICD-10-CM

## 2017-08-15 DIAGNOSIS — K81.9 CHOLECYSTITIS: Primary | ICD-10-CM

## 2017-08-15 DIAGNOSIS — K81.9 CHOLECYSTITIS: ICD-10-CM

## 2017-08-15 LAB
ALBUMIN SERPL-MCNC: 4.1 G/DL (ref 3.4–4.8)
ALBUMIN/GLOB SERPL: 1.4 G/DL (ref 1.1–1.8)
ALP SERPL-CCNC: 104 U/L (ref 38–126)
ALT SERPL W P-5'-P-CCNC: 38 U/L (ref 21–72)
AMYLASE SERPL-CCNC: 72 U/L (ref 50–130)
ANION GAP SERPL CALCULATED.3IONS-SCNC: 12 MMOL/L (ref 5–15)
AST SERPL-CCNC: 25 U/L (ref 17–59)
BACTERIA UR QL AUTO: ABNORMAL /HPF
BASOPHILS # BLD AUTO: 0.01 10*3/MM3 (ref 0–0.2)
BASOPHILS NFR BLD AUTO: 0.1 % (ref 0–2)
BILIRUB SERPL-MCNC: 0.3 MG/DL (ref 0.2–1.3)
BILIRUB UR QL STRIP: NEGATIVE
BUN BLD-MCNC: 18 MG/DL (ref 7–21)
BUN/CREAT SERPL: 14.2 (ref 7–25)
CALCIUM SPEC-SCNC: 8.6 MG/DL (ref 8.4–10.2)
CHLORIDE SERPL-SCNC: 105 MMOL/L (ref 95–110)
CLARITY UR: CLEAR
CO2 SERPL-SCNC: 24 MMOL/L (ref 22–31)
COLOR UR: YELLOW
CREAT BLD-MCNC: 1.27 MG/DL (ref 0.7–1.3)
DEPRECATED RDW RBC AUTO: 42.6 FL (ref 35.1–43.9)
EOSINOPHIL # BLD AUTO: 0.11 10*3/MM3 (ref 0–0.7)
EOSINOPHIL NFR BLD AUTO: 0.8 % (ref 0–7)
ERYTHROCYTE [DISTWIDTH] IN BLOOD BY AUTOMATED COUNT: 13.7 % (ref 11.5–14.5)
GFR SERPL CREATININE-BSD FRML MDRD: 66 ML/MIN/1.73 (ref 70–162)
GLOBULIN UR ELPH-MCNC: 2.9 GM/DL (ref 2.3–3.5)
GLUCOSE BLD-MCNC: 171 MG/DL (ref 60–100)
GLUCOSE UR STRIP-MCNC: NEGATIVE MG/DL
HCT VFR BLD AUTO: 36.8 % (ref 39–49)
HGB BLD-MCNC: 12.4 G/DL (ref 13.7–17.3)
HGB UR QL STRIP.AUTO: NEGATIVE
HOLD SPECIMEN: NORMAL
HOLD SPECIMEN: NORMAL
HYALINE CASTS UR QL AUTO: ABNORMAL /LPF
IMM GRANULOCYTES # BLD: 0.03 10*3/MM3 (ref 0–0.02)
IMM GRANULOCYTES NFR BLD: 0.2 % (ref 0–0.5)
KETONES UR QL STRIP: ABNORMAL
LEUKOCYTE ESTERASE UR QL STRIP.AUTO: NEGATIVE
LIPASE SERPL-CCNC: 196 U/L (ref 23–300)
LYMPHOCYTES # BLD AUTO: 2.22 10*3/MM3 (ref 0.6–4.2)
LYMPHOCYTES NFR BLD AUTO: 15.5 % (ref 10–50)
MCH RBC QN AUTO: 28.9 PG (ref 26.5–34)
MCHC RBC AUTO-ENTMCNC: 33.7 G/DL (ref 31.5–36.3)
MCV RBC AUTO: 85.8 FL (ref 80–98)
MONOCYTES # BLD AUTO: 1.16 10*3/MM3 (ref 0–0.9)
MONOCYTES NFR BLD AUTO: 8.1 % (ref 0–12)
MUCOUS THREADS URNS QL MICRO: ABNORMAL /HPF
NEUTROPHILS # BLD AUTO: 10.79 10*3/MM3 (ref 2–8.6)
NEUTROPHILS NFR BLD AUTO: 75.3 % (ref 37–80)
NITRITE UR QL STRIP: NEGATIVE
PH UR STRIP.AUTO: 6 [PH] (ref 5–9)
PLATELET # BLD AUTO: 256 10*3/MM3 (ref 150–450)
PMV BLD AUTO: 10.4 FL (ref 8–12)
POTASSIUM BLD-SCNC: 3.5 MMOL/L (ref 3.5–5.1)
PROT SERPL-MCNC: 7 G/DL (ref 6.3–8.6)
PROT UR QL STRIP: ABNORMAL
RBC # BLD AUTO: 4.29 10*6/MM3 (ref 4.37–5.74)
RBC # UR: ABNORMAL /HPF
REF LAB TEST METHOD: ABNORMAL
SODIUM BLD-SCNC: 141 MMOL/L (ref 137–145)
SP GR UR STRIP: 1.03 (ref 1–1.03)
SQUAMOUS #/AREA URNS HPF: ABNORMAL /HPF
UROBILINOGEN UR QL STRIP: ABNORMAL
WBC NRBC COR # BLD: 14.32 10*3/MM3 (ref 3.2–9.8)
WBC UR QL AUTO: ABNORMAL /HPF
WHOLE BLOOD HOLD SPECIMEN: NORMAL
WHOLE BLOOD HOLD SPECIMEN: NORMAL

## 2017-08-15 PROCEDURE — 99284 EMERGENCY DEPT VISIT MOD MDM: CPT

## 2017-08-15 PROCEDURE — 25010000002 ONDANSETRON PER 1 MG: Performed by: NURSE ANESTHETIST, CERTIFIED REGISTERED

## 2017-08-15 PROCEDURE — 25010000002 ONDANSETRON PER 1 MG: Performed by: FAMILY MEDICINE

## 2017-08-15 PROCEDURE — 83690 ASSAY OF LIPASE: CPT | Performed by: FAMILY MEDICINE

## 2017-08-15 PROCEDURE — 25010000002 MIDAZOLAM PER 1 MG: Performed by: NURSE ANESTHETIST, CERTIFIED REGISTERED

## 2017-08-15 PROCEDURE — 47600 CHOLECYSTECTOMY: CPT | Performed by: SURGERY

## 2017-08-15 PROCEDURE — G0378 HOSPITAL OBSERVATION PER HR: HCPCS

## 2017-08-15 PROCEDURE — 25010000003 CEFAZOLIN PER 500 MG: Performed by: NURSE ANESTHETIST, CERTIFIED REGISTERED

## 2017-08-15 PROCEDURE — 25010000002 MORPHINE PER 10 MG: Performed by: FAMILY MEDICINE

## 2017-08-15 PROCEDURE — 25010000002 PROPOFOL 10 MG/ML EMULSION: Performed by: NURSE ANESTHETIST, CERTIFIED REGISTERED

## 2017-08-15 PROCEDURE — 25010000002 HYDROMORPHONE PER 4 MG: Performed by: NURSE ANESTHETIST, CERTIFIED REGISTERED

## 2017-08-15 PROCEDURE — 25010000002 FENTANYL CITRATE (PF) 100 MCG/2ML SOLUTION: Performed by: NURSE ANESTHETIST, CERTIFIED REGISTERED

## 2017-08-15 PROCEDURE — 81001 URINALYSIS AUTO W/SCOPE: CPT | Performed by: FAMILY MEDICINE

## 2017-08-15 PROCEDURE — 76705 ECHO EXAM OF ABDOMEN: CPT

## 2017-08-15 PROCEDURE — 80053 COMPREHEN METABOLIC PANEL: CPT | Performed by: FAMILY MEDICINE

## 2017-08-15 PROCEDURE — 88304 TISSUE EXAM BY PATHOLOGIST: CPT | Performed by: SURGERY

## 2017-08-15 PROCEDURE — 25010000002 HYDROMORPHONE PER 4 MG: Performed by: SURGERY

## 2017-08-15 PROCEDURE — 0FJ44ZZ INSPECTION OF GALLBLADDER, PERCUTANEOUS ENDOSCOPIC APPROACH: ICD-10-PCS | Performed by: SURGERY

## 2017-08-15 PROCEDURE — 25010000002 ONDANSETRON PER 1 MG: Performed by: SURGERY

## 2017-08-15 PROCEDURE — 88304 TISSUE EXAM BY PATHOLOGIST: CPT | Performed by: PATHOLOGY

## 2017-08-15 PROCEDURE — 25010000002 MORPHINE PER 10 MG: Performed by: SURGERY

## 2017-08-15 PROCEDURE — 25010000002 DEXAMETHASONE PER 1 MG: Performed by: NURSE ANESTHETIST, CERTIFIED REGISTERED

## 2017-08-15 PROCEDURE — 85025 COMPLETE CBC W/AUTO DIFF WBC: CPT | Performed by: FAMILY MEDICINE

## 2017-08-15 PROCEDURE — 82150 ASSAY OF AMYLASE: CPT | Performed by: FAMILY MEDICINE

## 2017-08-15 PROCEDURE — 0 IOPAMIDOL 61 % SOLUTION: Performed by: SURGERY

## 2017-08-15 PROCEDURE — 0FT40ZZ RESECTION OF GALLBLADDER, OPEN APPROACH: ICD-10-PCS | Performed by: SURGERY

## 2017-08-15 PROCEDURE — 94799 UNLISTED PULMONARY SVC/PX: CPT

## 2017-08-15 RX ORDER — METOPROLOL TARTRATE 5 MG/5ML
INJECTION INTRAVENOUS AS NEEDED
Status: DISCONTINUED | OUTPATIENT
Start: 2017-08-15 | End: 2017-08-15 | Stop reason: SURG

## 2017-08-15 RX ORDER — ROCURONIUM BROMIDE 10 MG/ML
INJECTION, SOLUTION INTRAVENOUS AS NEEDED
Status: DISCONTINUED | OUTPATIENT
Start: 2017-08-15 | End: 2017-08-15 | Stop reason: SURG

## 2017-08-15 RX ORDER — NALOXONE HCL 0.4 MG/ML
0.4 VIAL (ML) INJECTION
Status: DISCONTINUED | OUTPATIENT
Start: 2017-08-15 | End: 2017-08-17 | Stop reason: HOSPADM

## 2017-08-15 RX ORDER — SODIUM CHLORIDE 9 MG/ML
125 INJECTION, SOLUTION INTRAVENOUS CONTINUOUS
Status: DISCONTINUED | OUTPATIENT
Start: 2017-08-15 | End: 2017-08-17 | Stop reason: HOSPADM

## 2017-08-15 RX ORDER — SODIUM CHLORIDE 0.9 % (FLUSH) 0.9 %
10 SYRINGE (ML) INJECTION AS NEEDED
Status: DISCONTINUED | OUTPATIENT
Start: 2017-08-15 | End: 2017-08-16

## 2017-08-15 RX ORDER — SODIUM CHLORIDE, SODIUM GLUCONATE, SODIUM ACETATE, POTASSIUM CHLORIDE, AND MAGNESIUM CHLORIDE 526; 502; 368; 37; 30 MG/100ML; MG/100ML; MG/100ML; MG/100ML; MG/100ML
INJECTION, SOLUTION INTRAVENOUS CONTINUOUS PRN
Status: DISCONTINUED | OUTPATIENT
Start: 2017-08-15 | End: 2017-08-15 | Stop reason: SURG

## 2017-08-15 RX ORDER — ONDANSETRON 2 MG/ML
4 INJECTION INTRAMUSCULAR; INTRAVENOUS ONCE
Status: COMPLETED | OUTPATIENT
Start: 2017-08-15 | End: 2017-08-15

## 2017-08-15 RX ORDER — HYDROCODONE BITARTRATE AND ACETAMINOPHEN 7.5; 325 MG/1; MG/1
1 TABLET ORAL EVERY 4 HOURS PRN
Status: DISCONTINUED | OUTPATIENT
Start: 2017-08-15 | End: 2017-08-17 | Stop reason: HOSPADM

## 2017-08-15 RX ORDER — DEXTROSE, SODIUM CHLORIDE, AND POTASSIUM CHLORIDE 5; .45; .15 G/100ML; G/100ML; G/100ML
100 INJECTION INTRAVENOUS CONTINUOUS
Status: DISCONTINUED | OUTPATIENT
Start: 2017-08-15 | End: 2017-08-17 | Stop reason: HOSPADM

## 2017-08-15 RX ORDER — DEXTROSE, SODIUM CHLORIDE, AND POTASSIUM CHLORIDE 5; .45; .15 G/100ML; G/100ML; G/100ML
125 INJECTION INTRAVENOUS CONTINUOUS
Status: DISCONTINUED | OUTPATIENT
Start: 2017-08-15 | End: 2017-08-17 | Stop reason: HOSPADM

## 2017-08-15 RX ORDER — HYDROMORPHONE HCL 110MG/55ML
PATIENT CONTROLLED ANALGESIA SYRINGE INTRAVENOUS AS NEEDED
Status: DISCONTINUED | OUTPATIENT
Start: 2017-08-15 | End: 2017-08-15 | Stop reason: SURG

## 2017-08-15 RX ORDER — DEXAMETHASONE SODIUM PHOSPHATE 4 MG/ML
INJECTION, SOLUTION INTRA-ARTICULAR; INTRALESIONAL; INTRAMUSCULAR; INTRAVENOUS; SOFT TISSUE AS NEEDED
Status: DISCONTINUED | OUTPATIENT
Start: 2017-08-15 | End: 2017-08-15 | Stop reason: SURG

## 2017-08-15 RX ORDER — PROPOFOL 10 MG/ML
VIAL (ML) INTRAVENOUS AS NEEDED
Status: DISCONTINUED | OUTPATIENT
Start: 2017-08-15 | End: 2017-08-15 | Stop reason: SURG

## 2017-08-15 RX ORDER — DEXTROSE AND SODIUM CHLORIDE 5; .45 G/100ML; G/100ML
100 INJECTION, SOLUTION INTRAVENOUS CONTINUOUS
Status: CANCELLED | OUTPATIENT
Start: 2017-08-15

## 2017-08-15 RX ORDER — DEXTROSE AND SODIUM CHLORIDE 5; .45 G/100ML; G/100ML
100 INJECTION, SOLUTION INTRAVENOUS CONTINUOUS
Status: DISCONTINUED | OUTPATIENT
Start: 2017-08-15 | End: 2017-08-17 | Stop reason: HOSPADM

## 2017-08-15 RX ORDER — FENTANYL CITRATE 50 UG/ML
INJECTION, SOLUTION INTRAMUSCULAR; INTRAVENOUS AS NEEDED
Status: DISCONTINUED | OUTPATIENT
Start: 2017-08-15 | End: 2017-08-15 | Stop reason: SURG

## 2017-08-15 RX ORDER — CEFAZOLIN SODIUM 1 G/3ML
INJECTION, POWDER, FOR SOLUTION INTRAMUSCULAR; INTRAVENOUS AS NEEDED
Status: DISCONTINUED | OUTPATIENT
Start: 2017-08-15 | End: 2017-08-15 | Stop reason: SURG

## 2017-08-15 RX ORDER — BACTERIOSTATIC SODIUM CHLORIDE 0.9 %
VIAL (ML) INJECTION AS NEEDED
Status: DISCONTINUED | OUTPATIENT
Start: 2017-08-15 | End: 2017-08-17 | Stop reason: HOSPADM

## 2017-08-15 RX ORDER — MORPHINE SULFATE 4 MG/ML
4 INJECTION, SOLUTION INTRAMUSCULAR; INTRAVENOUS
Status: DISCONTINUED | OUTPATIENT
Start: 2017-08-15 | End: 2017-08-15

## 2017-08-15 RX ORDER — MORPHINE SULFATE 4 MG/ML
4 INJECTION, SOLUTION INTRAMUSCULAR; INTRAVENOUS ONCE
Status: COMPLETED | OUTPATIENT
Start: 2017-08-15 | End: 2017-08-15

## 2017-08-15 RX ORDER — ONDANSETRON 2 MG/ML
4 INJECTION INTRAMUSCULAR; INTRAVENOUS EVERY 4 HOURS PRN
Status: DISCONTINUED | OUTPATIENT
Start: 2017-08-15 | End: 2017-08-17 | Stop reason: HOSPADM

## 2017-08-15 RX ORDER — MIDAZOLAM HYDROCHLORIDE 1 MG/ML
INJECTION INTRAMUSCULAR; INTRAVENOUS AS NEEDED
Status: DISCONTINUED | OUTPATIENT
Start: 2017-08-15 | End: 2017-08-15 | Stop reason: SURG

## 2017-08-15 RX ORDER — SODIUM CHLORIDE 9 MG/ML
1000 INJECTION, SOLUTION INTRAVENOUS ONCE
Status: COMPLETED | OUTPATIENT
Start: 2017-08-15 | End: 2017-08-15

## 2017-08-15 RX ORDER — ONDANSETRON 2 MG/ML
INJECTION INTRAMUSCULAR; INTRAVENOUS AS NEEDED
Status: DISCONTINUED | OUTPATIENT
Start: 2017-08-15 | End: 2017-08-15 | Stop reason: SURG

## 2017-08-15 RX ORDER — BUPIVACAINE HYDROCHLORIDE AND EPINEPHRINE 5; 5 MG/ML; UG/ML
INJECTION, SOLUTION EPIDURAL; INTRACAUDAL; PERINEURAL AS NEEDED
Status: DISCONTINUED | OUTPATIENT
Start: 2017-08-15 | End: 2017-08-17 | Stop reason: HOSPADM

## 2017-08-15 RX ORDER — LIDOCAINE HYDROCHLORIDE 20 MG/ML
INJECTION, SOLUTION INFILTRATION; PERINEURAL AS NEEDED
Status: DISCONTINUED | OUTPATIENT
Start: 2017-08-15 | End: 2017-08-15 | Stop reason: SURG

## 2017-08-15 RX ADMIN — SODIUM CHLORIDE 1000 ML: 900 INJECTION, SOLUTION INTRAVENOUS at 00:51

## 2017-08-15 RX ADMIN — HYDROMORPHONE HYDROCHLORIDE 0.5 MG: 2 INJECTION, SOLUTION INTRAMUSCULAR; INTRAVENOUS; SUBCUTANEOUS at 15:43

## 2017-08-15 RX ADMIN — PROPOFOL 180 MG: 10 INJECTION, EMULSION INTRAVENOUS at 14:22

## 2017-08-15 RX ADMIN — ONDANSETRON 4 MG: 2 INJECTION INTRAMUSCULAR; INTRAVENOUS at 00:51

## 2017-08-15 RX ADMIN — POTASSIUM CHLORIDE, DEXTROSE MONOHYDRATE AND SODIUM CHLORIDE 100 ML/HR: 150; 5; 450 INJECTION, SOLUTION INTRAVENOUS at 18:42

## 2017-08-15 RX ADMIN — FENTANYL CITRATE 50 MCG: 50 INJECTION, SOLUTION INTRAMUSCULAR; INTRAVENOUS at 14:22

## 2017-08-15 RX ADMIN — AMPICILLIN SODIUM AND SULBACTAM SODIUM 3 G: 2; 1 INJECTION, POWDER, FOR SOLUTION INTRAMUSCULAR; INTRAVENOUS at 19:20

## 2017-08-15 RX ADMIN — POTASSIUM CHLORIDE, DEXTROSE MONOHYDRATE AND SODIUM CHLORIDE 125 ML/HR: 150; 5; 450 INJECTION, SOLUTION INTRAVENOUS at 05:00

## 2017-08-15 RX ADMIN — MORPHINE SULFATE 4 MG: 4 INJECTION, SOLUTION INTRAMUSCULAR; INTRAVENOUS at 01:02

## 2017-08-15 RX ADMIN — FENTANYL CITRATE 50 MCG: 50 INJECTION, SOLUTION INTRAMUSCULAR; INTRAVENOUS at 14:41

## 2017-08-15 RX ADMIN — METOPROLOL TARTRATE 1 MG: 5 INJECTION INTRAVENOUS at 14:52

## 2017-08-15 RX ADMIN — ROCURONIUM BROMIDE 40 MG: 10 INJECTION INTRAVENOUS at 14:22

## 2017-08-15 RX ADMIN — METOPROLOL TARTRATE 1 MG: 5 INJECTION INTRAVENOUS at 15:46

## 2017-08-15 RX ADMIN — DEXAMETHASONE SODIUM PHOSPHATE 4 MG: 4 INJECTION, SOLUTION INTRAMUSCULAR; INTRAVENOUS at 14:22

## 2017-08-15 RX ADMIN — HYDROMORPHONE HYDROCHLORIDE 1 MG: 1 INJECTION, SOLUTION INTRAMUSCULAR; INTRAVENOUS; SUBCUTANEOUS at 19:44

## 2017-08-15 RX ADMIN — LIDOCAINE HYDROCHLORIDE 50 MG: 20 INJECTION, SOLUTION INFILTRATION; PERINEURAL at 14:22

## 2017-08-15 RX ADMIN — HYDROMORPHONE HYDROCHLORIDE 1 MG: 2 INJECTION, SOLUTION INTRAMUSCULAR; INTRAVENOUS; SUBCUTANEOUS at 14:46

## 2017-08-15 RX ADMIN — METOPROLOL TARTRATE 1 MG: 5 INJECTION INTRAVENOUS at 15:02

## 2017-08-15 RX ADMIN — CEFAZOLIN SODIUM 2 G: 1 INJECTION, POWDER, FOR SOLUTION INTRAMUSCULAR; INTRAVENOUS at 14:40

## 2017-08-15 RX ADMIN — SODIUM CHLORIDE 125 ML/HR: 900 INJECTION, SOLUTION INTRAVENOUS at 00:44

## 2017-08-15 RX ADMIN — MORPHINE SULFATE 4 MG: 4 INJECTION, SOLUTION INTRAMUSCULAR; INTRAVENOUS at 17:44

## 2017-08-15 RX ADMIN — SODIUM CHLORIDE, SODIUM GLUCONATE, SODIUM ACETATE, POTASSIUM CHLORIDE, AND MAGNESIUM CHLORIDE: 526; 502; 368; 37; 30 INJECTION, SOLUTION INTRAVENOUS at 15:11

## 2017-08-15 RX ADMIN — MIDAZOLAM 2 MG: 1 INJECTION INTRAMUSCULAR; INTRAVENOUS at 14:14

## 2017-08-15 RX ADMIN — HYDROMORPHONE HYDROCHLORIDE 1 MG: 1 INJECTION, SOLUTION INTRAMUSCULAR; INTRAVENOUS; SUBCUTANEOUS at 23:03

## 2017-08-15 RX ADMIN — ONDANSETRON 4 MG: 2 INJECTION INTRAMUSCULAR; INTRAVENOUS at 19:44

## 2017-08-15 RX ADMIN — ONDANSETRON 4 MG: 2 INJECTION INTRAMUSCULAR; INTRAVENOUS at 14:22

## 2017-08-15 RX ADMIN — PROPOFOL 50 MG: 10 INJECTION, EMULSION INTRAVENOUS at 15:43

## 2017-08-15 RX ADMIN — AMPICILLIN SODIUM AND SULBACTAM SODIUM 3 G: 2; 1 INJECTION, POWDER, FOR SOLUTION INTRAMUSCULAR; INTRAVENOUS at 02:24

## 2017-08-15 RX ADMIN — DEXTROSE AND SODIUM CHLORIDE 100 ML/HR: 5; 450 INJECTION, SOLUTION INTRAVENOUS at 08:23

## 2017-08-15 NOTE — OP NOTE
CHOLECYSTECTOMY LAPAROSCOPIC INTRAOPERATIVE CHOLANGIOGRAM  Procedure Note    Giacomo Yanez  8/15/2017    Pre-op Diagnosis:   Cholecystitis [K81.9]    Post-op Diagnosis:     Post-Op Diagnosis Codes:     * Cholecystitis [K81.9]    Procedure/CPT® Codes:      Procedure(s):  Subtotal open cholecystectomy  TAP block    Surgeon(s):  Ace Parada MD    Anesthesia: General    Staff:   Circulator: Brittany Spears RN  Scrub Person: Louise Tello  Endo Technician: Alexia Bhat CST  Assistant: Coral Ortiz CSA    Estimated Blood Loss: *No blood loss documented*    Specimens:                  ID Type Source Tests Collected by Time Destination   A : PATIENT WISHES TO KEEP ANY GALLSTONES- PLEASE RETURN TO PATIENT Tissue Gallbladder TISSUE EXAM Ace Parada MD 8/15/2017 1554          Drains:  10 mm Peter-Gaming drain  Drain/Device Site 08/15/17 1556 Right lateral upper quadrant other (see comments) 1 (Active)           Indications: 31 -year-old male admitted late last night with acute cholecystitis.  LFTs normal white count normal.  Patient's pain resolved with IV fluids and antibiotics.  Ultrasound demonstrated gallstones.  Presents now for laparoscopic cholecystectomy and interoperative cholangiogram    Findings:  Chronically acutely inflamed gallbladder with omentum densely adherent to the gallbladder along with the duodenum adherent to the gallbladder as well.  Converted to open due to the fact that I could not develop a tissue plane to free the gallbladder up anteriorly.  No cholangiogram performed.  Cystic duct was closed from within the neck of the gallbladder.  2 cm stone at the gallbladder and a couple 1.5 cm stones up in the body of this contracted gallbladder.  Left back wall of gallbladder.    Complications: None    Procedure: The patient was brought to the operating room and was placed under general endotracheal anesthesia without difficulty, had SCDs in place and received Ancef IV antibiotics  preoperatively. His abdomen was prepped and draped in the normal sterile fashion. Appropriate timeout was taken. A cutdown was performed at the supraumbilical position and a 10/11 Sushma trocar was placed without difficulty. Adequate pneumoperitoneum was obtained. TAP abdominal wall block was then performed with 30 mL of 0.5% Marcaine with 20 mL injected on the right side of the abdomen and 10 mL on the left side using the laparoscope for guidance. Once that was done, we then attempted to free up the gallbladder. We could see a small portion of the very top of the gallbladder, we grasped that and then attempted to peel the omentum off of the gallbladder but there was no real plane there and there was significant bleeding as we tried to peel this off the gallbladder. We attempted initially with blunt dissection and then attempted using the Harmonic scalpel, but again could not develop a plane freeing the gallbladder up from the omentum. We did not attempt to go down to the neck of the gallbladder. We felt that the safest thing to do was to open the patient at this point, which we did. We removed our trocars and the fascia at the cutdown site was closed with 2-0 Vicryl figure-of-eight stitch. We then made our skin incision connecting at our trocar sites subcostal margin sharply and followed it down to the subcutaneous tissue with electrocautery. We continue dissection with cauterer and entered the abdominal wall again with electrocautery without difficulty. Once inside the abdomen we explored the right upper quadrant. The gallbladder was contracted with omentum densely adherent to it. We began by freeing the omentum up from the gallbladder in a dome-down type technique.   the duodenum it was somewhat adherent to this. We were able to push that away using gentle blunt dissection. There was no connection to the duodenum. It appeared to be a contracted gallbladder with a fairly large, about 2 cm, stone in the neck of the  gallbladder and some smaller 1.5 cm stones up in the body of the gallbladder. We attempted  to take the gallbladder down off of the bed of the liver in a dome-down type technique, but there was no plane to be developed so at that point we elected to go ahead and open the gallbladder and the wall appeared to be somewhat thickened and contracted. We went ahead and divided the exposed wall of the gallbladder using electrocautery and removed all of the stones. In the neck of the gallbladder we removed the larger stone. There did not appear to be any real bile within the gallbladder. We did not attempt to take the back wall down or the back wall of the neck of the gallbladder, we left that in place as well. We identified what we thought was the opening to the cystic duct and we closed that with a figure-of-eight stitch of 3-0 Vicryl. The bleeding was controlled at the edges of the gallbladder with electrocautery. We copiously irrigated and good hemostasis was noted. No evidence of any bile leak. We then brought a 10 mm Peter-Gaming drain out through a separate stab incision lateral to our incision. We laid it in the bed of the gallbladder where resection had occurred and secured it at the skin with a 2-0 Silk suture. Again, we inspected the drain and it appeared to be in good position, and had good hemostasis. Bleeding was well-controlled. We closed our fascia in 2 layers with looped #1 PDS running suture. Subcutaneous was closed with 2-0 Vicryl figure-of-eight stitch. Skin was closed with staples at both sites. Dressing was applied. The patient was awakened, extubated and transferred to the recovery room in stable condition.                                    Disposition:  Has further currently awake stable condition.  Patient will be transferred back to the floor.  I spoke with his wife and explained the findings.      EMR Dragon/Transcription disclaimer:  Much of this encounter note is on electronic  transcription/translation of spoken language to printed text.  The electronic translation of spoken language may permit erroneous or at times, nonsensical words or phrases to be inadvertently transcribed;  Although I have reviewed the note for such errors, some may still exist.            Ace Parada MD     Date: 8/15/2017  Time: 4:20 PM

## 2017-08-15 NOTE — H&P
Referring Provider:  Cady Mayo      Patient Care Team:  Simran Rosas MD as PCP - General      Subjective .     History of present illness:   31-year-old male presented to emergency room last evening with an episode of right upper quadrant pain and associated nausea and vomiting.  Patient's had similar episodes to this in the past but this was the more severe.  Pain is completely resolved this morning and no nausea or vomiting and is hungry.  Patient's been afebrile overnight vital signs are stable.  Denies any history of pancreatitis or being jaundiced.  Has not had any abdominal surgery in the past.  An ultrasound which demonstrates gallstones and a 6 mm common bile duct.  His LFTs were normal    Review of Systems   Constitutional: Negative.    HENT: Negative for hearing loss, nosebleeds and trouble swallowing.    Respiratory: Negative for apnea, chest tightness and shortness of breath.    Cardiovascular: Negative for chest pain and palpitations.   Gastrointestinal: Negative for abdominal distention, abdominal pain, blood in stool, constipation, diarrhea, nausea and vomiting.   Genitourinary: Negative for difficulty urinating, dysuria, frequency and urgency.   Musculoskeletal: Negative for back pain, joint swelling and neck pain.   Skin: Negative for rash.   Neurological: Negative for dizziness, seizures, weakness, light-headedness, numbness and headaches.   Hematological: Negative for adenopathy.   Psychiatric/Behavioral: Negative for agitation. The patient is not nervous/anxious.          History  Past Medical History:   Diagnosis Date   • Astigmatism    • Bone disease    • Chronic pain    • Fibroma of lower extremity    • Myopia    , Past Surgical History:   Procedure Laterality Date   • LEG SURGERY Right 06/07/2017   , Family History   Problem Relation Age of Onset   • Thyroid disease Mother    , Social History   Substance Use Topics   • Smoking status: Never Smoker   • Smokeless tobacco:  Never Used   • Alcohol use No   , Prescriptions Prior to Admission   Medication Sig Dispense Refill Last Dose   • aspirin 81 MG chewable tablet Chew 81 mg Daily.   Past Month at Unknown time   • acetic acid-hydrocortisone (VOSOL-HC) 1-2 % otic solution Administer 4 drops into ears 2 (Two) Times a Week. 10 mL 11 Taking   • albuterol (PROVENTIL HFA;VENTOLIN HFA) 108 (90 BASE) MCG/ACT inhaler Inhale 2 puffs Every 6 (Six) Hours As Needed for wheezing. 1 inhaler 2 Taking   , Scheduled Meds:   , Continuous Infusions:    dextrose 5 % and sodium chloride 0.45 % with KCl 20 mEq/L 125 mL/hr Last Rate: 125 mL/hr (08/15/17 0500)   sodium chloride 125 mL/hr Last Rate: Stopped (08/15/17 0500)   , PRN Meds:  sodium chloride and Allergies:  Review of patient's allergies indicates no known allergies.    Objective     Vital Signs   Temp:  [97.9 °F (36.6 °C)-98.8 °F (37.1 °C)] 97.9 °F (36.6 °C)  Heart Rate:  [44-86] 45  Resp:  [16] 16  BP: (119-147)/(67-89) 137/85    Physical Exam:     General Appearance:    Alert, cooperative, in no acute distress   Head:    Normocephalic, without obvious abnormality, atraumatic   Eyes:            Lids and lashes normal, conjunctivae and sclerae normal, no   icterus, no pallor, corneas clear      Nonicteric    Throat:   No oral lesions, no thrush, oral mucosa moist   Neck:   No adenopathy, supple, trachea midline, no thyromegaly,   no JVD   Lungs:     Clear to auscultation,respirations regular, even and                  unlabored    Heart:    Regular rhythm and normal rate, normal S1 and S2, no            murmur, no gallop, no rub, no click   Chest Wall:    No abnormalities observed   Abdomen:     Normal bowel sounds, no masses, no organomegaly, soft        non-tender, non-distended, no guarding, no rebound                tenderness   Extremities:   Moves all extremities well, no edema, no cyanosis, no             redness   Skin:   No bleeding, bruising or rash   Lymph nodes:   No palpable adenopathy    Neurologic:  Grossly intact, sensation intact      Assessment/Plan cholelithiasis and chronic cholecystitis.  I would recommend this patient undergo laparoscopic cholecystectomy and interoperative cholangiogram.  At least a pamphlet and fully instructed him in the procedure alternatives risk and benefits and he clearly understands and wishes to proceed    Active Problems:    Calculus of gallbladder with acute cholecystitis without obstruction             This document has been electronically signed by Ace Parada MD on August 15, 2017 7:22 AM     Ace Parada MD  08/15/17  7:22 AM            EMR Dragon/Transcription disclaimer:  Much of this encounter note is on electronic transcription/translation of spoken language to printed text.  The electronic translation of spoken language may permit erroneous or at times, nonsensical words or phrases to be inadvertently transcribed;  Although I have reviewed the note for such errors, some may still exist.

## 2017-08-15 NOTE — ED PROVIDER NOTES
Subjective   Patient is a 31 y.o. male presenting with abdominal pain.   Abdominal Pain   Pain location:  RUQ  Pain quality: cramping and gnawing    Pain radiates to:  Back  Pain severity:  Severe  Onset quality:  Sudden  Duration:  4 hours  Timing:  Intermittent  Progression:  Waxing and waning  Chronicity:  Recurrent  Context: eating    Relieved by:  Nothing  Worsened by:  Eating  Ineffective treatments:  None tried  Associated symptoms: vomiting    Associated symptoms: no chest pain, no chills, no constipation, no cough, no dysuria, no fatigue, no fever, no nausea, no shortness of breath and no sore throat  Diarrhea: x 2 weeks.        Review of Systems   Constitutional: Negative for appetite change, chills, diaphoresis, fatigue and fever.   HENT: Negative for congestion, ear discharge, ear pain, nosebleeds, rhinorrhea, sinus pressure, sore throat and trouble swallowing.    Eyes: Negative for discharge and redness.   Respiratory: Negative for apnea, cough, chest tightness, shortness of breath and wheezing.    Cardiovascular: Negative for chest pain.   Gastrointestinal: Positive for abdominal pain and vomiting. Negative for constipation and nausea. Diarrhea: x 2 weeks.   Endocrine: Negative for polyuria.   Genitourinary: Negative for dysuria, frequency and urgency.   Musculoskeletal: Negative for myalgias and neck pain.   Skin: Negative for color change and rash.   Allergic/Immunologic: Negative for immunocompromised state.   Neurological: Negative for dizziness, seizures, syncope, weakness, light-headedness and headaches.   Hematological: Negative for adenopathy. Does not bruise/bleed easily.   Psychiatric/Behavioral: Negative for behavioral problems and confusion.   All other systems reviewed and are negative.      Past Medical History:   Diagnosis Date   • Astigmatism    • Bone disease    • Chronic pain    • Fibroma of lower extremity    • Myopia        No Known Allergies    Past Surgical History:   Procedure  Laterality Date   • CHOLECYSTECTOMY WITH INTRAOPERATIVE CHOLANGIOGRAM N/A 8/15/2017    Procedure: CHOLECYSTECTOMY LAPAROSCOPIC INTRAOPERATIVE CHOLANGIOGRAM;  Surgeon: Ace Parada MD;  Location: [facility];  Service:    • LEG SURGERY Right 06/07/2017       Family History   Problem Relation Age of Onset   • Thyroid disease Mother        Social History     Social History   • Marital status:      Spouse name: N/A   • Number of children: N/A   • Years of education: N/A     Social History Main Topics   • Smoking status: Never Smoker   • Smokeless tobacco: Never Used   • Alcohol use No   • Drug use: No   • Sexual activity: Yes     Partners: Female     Other Topics Concern   • None     Social History Narrative           Objective   Physical Exam   Constitutional: He is oriented to person, place, and time. He appears well-developed and well-nourished.   HENT:   Head: Normocephalic and atraumatic.   Nose: Nose normal.   Mouth/Throat: Oropharynx is clear and moist.   Eyes: Conjunctivae and EOM are normal. Pupils are equal, round, and reactive to light. Right eye exhibits no discharge. Left eye exhibits no discharge. No scleral icterus.   Neck: Normal range of motion. Neck supple. No tracheal deviation present.   Cardiovascular: Normal rate, regular rhythm and normal heart sounds.    No murmur heard.  Pulmonary/Chest: Effort normal and breath sounds normal. No stridor. No respiratory distress. He has no wheezes. He has no rales.   Abdominal: Soft. Bowel sounds are normal. He exhibits no distension and no mass. There is tenderness. There is no rebound and no guarding.   Musculoskeletal: He exhibits no edema.   Neurological: He is alert and oriented to person, place, and time. Coordination normal.   Skin: Skin is warm and dry. No rash noted. No erythema.   Psychiatric: He has a normal mood and affect. His behavior is normal. Thought content normal.   Nursing note and vitals reviewed.      Procedures         ED  Course  ED Course          Labs Reviewed   COMPREHENSIVE METABOLIC PANEL - Abnormal; Notable for the following:        Result Value    Glucose 171 (*)     eGFR Non  Amer 66 (*)     All other components within normal limits   URINALYSIS W/ CULTURE IF INDICATED - Abnormal; Notable for the following:     Specific Gravity, UA 1.035 (*)     Ketones, UA 40 mg/dL (2+) (*)     Protein, UA 30 mg/dL (1+) (*)     All other components within normal limits   CBC WITH AUTO DIFFERENTIAL - Abnormal; Notable for the following:     WBC 14.32 (*)     RBC 4.29 (*)     Hemoglobin 12.4 (*)     Hematocrit 36.8 (*)     Neutrophils, Absolute 10.79 (*)     Monocytes, Absolute 1.16 (*)     Immature Grans, Absolute 0.03 (*)     All other components within normal limits   URINALYSIS, MICROSCOPIC ONLY - Abnormal; Notable for the following:     RBC, UA 0-2 (*)     Bacteria, UA Trace (*)     Mucus, UA Small/1+ (*)     All other components within normal limits   CBC WITH AUTO DIFFERENTIAL - Abnormal; Notable for the following:     WBC 14.34 (*)     RBC 3.81 (*)     Hemoglobin 11.3 (*)     Hematocrit 32.7 (*)     RDW-SD 44.3 (*)     Neutrophil % 80.1 (*)     Neutrophils, Absolute 11.48 (*)     Monocytes, Absolute 1.21 (*)     Immature Grans, Absolute 0.03 (*)     All other components within normal limits   COMPREHENSIVE METABOLIC PANEL - Abnormal; Notable for the following:     Glucose 102 (*)     All other components within normal limits   LIPASE - Normal   AMYLASE - Normal   RAINBOW DRAW    Narrative:     The following orders were created for panel order [place] Draw.  Procedure                               Abnormality         Status                     ---------                               -----------         ------                     Light Blue Top[[id number]]                                   Final result               Green Top (Gel)[[id number]]                                  Final result               Lavender Top[[id number]]                                      Final result               Gold Top - [facility][[id number]]                                   Final result                 Please view results for these tests on the individual orders.   TISSUE PATHOLOGY EXAM   CBC AND DIFFERENTIAL    Narrative:     The following orders were created for panel order CBC & Differential.  Procedure                               Abnormality         Status                     ---------                               -----------         ------                     CBC Auto Differential[[id number]]        Abnormal            Final result                 Please view results for these tests on the individual orders.   LIGHT BLUE TOP   GREEN TOP   LAVENDER TOP   GOLD TOP - [facility]   CBC AND DIFFERENTIAL    Narrative:     The following orders were created for panel order CBC & Differential.  Procedure                               Abnormality         Status                     ---------                               -----------         ------                     CBC Auto Differential[[id number]]        Abnormal            Final result                 Please view results for these tests on the individual orders.   EXTRA TUBES    Narrative:     The following orders were created for panel order Extra Tubes.  Procedure                               Abnormality         Status                     ---------                               -----------         ------                     Lavender Top[[id number]]                                     Final result               Green Top (Gel)[[id number]]                                                               Please view results for these tests on the individual orders.   LAVENDER TOP   EXTRA TUBES    Narrative:     The following orders were created for panel order Extra Tubes.  Procedure                               Abnormality         Status                     ---------                               -----------         ------                      Gold Top - [facility] [place][[id number]]                                   Final result                 Please view results for these tests on the individual orders.   GOLD TOP - [facility]       US Gallbladder   Final Result   Cholelithiasis with borderline gallbladder wall   thickening. If there is high clinical concern for acute   cholecystitis consider follow-up hepatobiliary scan.      Electronically signed by:  Lucio Rhodes  8/15/2017 1:52 AM   CDT Workstation: [id number]                    [facility]    Final diagnoses:   Calculus of gallbladder with acute cholecystitis without obstruction            Rajesh Mayo MD  08/20/17 1747       Rajesh Mayo MD  09/11/17 0136

## 2017-08-15 NOTE — ANESTHESIA POSTPROCEDURE EVALUATION
Patient: Giacomo Yanez    Procedure Summary     Date Anesthesia Start Anesthesia Stop Room / Location    08/15/17 1415 1620 [facility] OR 03 / [facility]       Procedure Diagnosis Surgeon Provider    CHOLECYSTECTOMY LAPAROSCOPIC INTRAOPERATIVE CHOLANGIOGRAM (N/A Abdomen) Cholecystitis  (Cholecystitis [K81.9]) MD Jonas Bacon MD          Anesthesia Type: general  Last vitals  BP   132/86 (08/15/17 1614)    Temp   97.2 °F (36.2 °C) (08/15/17 1614)    Pulse   84 (08/15/17 1614)   Resp   16 (08/15/17 1614)    SpO2   96 % (08/15/17 1614)      Post Anesthesia Care and Evaluation    Patient location during evaluation: PACU  Patient participation: waiting for patient participation  Level of consciousness: responsive to verbal stimuli  Pain management: adequate  Airway patency: patent  Anesthetic complications: No anesthetic complications  PONV Status: none  Cardiovascular status: acceptable  Respiratory status: acceptable  Hydration status: acceptable

## 2017-08-15 NOTE — ANESTHESIA PREPROCEDURE EVALUATION
" Anesthesia Evaluation     Patient summary reviewed and Nursing notes reviewed   NPO Solid Status: > 8 hours       Airway   Mallampati: II  TM distance: >3 FB  Neck ROM: full  possible difficult intubation  Dental    (+) poor dentation    Comment: Remaining dentition in hopeless repair.Scheduled to have all \"snags\" removed August 24th.    Pulmonary - normal exam    breath sounds clear to auscultation  (+) asthma,   Cardiovascular - negative cardio ROS and normal exam    Rhythm: regular  Rate: normal        Neuro/Psych- negative ROS  GI/Hepatic/Renal/Endo - negative ROS     Musculoskeletal         ROS comment: Patient has osteogenesis imperfecta and associated deformities.  Abdominal    Substance History - negative use     OB/GYN negative ob/gyn ROS         Other      history of cancer (Fibroma removed 6 weeks ago from right lower femur.) remission                                    Anesthesia Plan    ASA 3     general     intravenous induction   Anesthetic plan and risks discussed with patient and spouse/significant other.      "

## 2017-08-15 NOTE — ANESTHESIA PROCEDURE NOTES
Airway  Urgency: elective    Airway not difficult    General Information and Staff    Patient location during procedure: OR    Indications and Patient Condition  Indications for airway management: airway protection    Preoxygenated: yes  MILS maintained throughout  Mask difficulty assessment: 2 - vent by mask + OA or adjuvant +/- NMBA    Final Airway Details  Final airway type: endotracheal airway      Successful airway: ETT  Cuffed: yes   Successful intubation technique: direct laryngoscopy  Endotracheal tube insertion site: oral  Blade: Shailesh  Blade size: #3  Cormack-Lehane Classification: grade I - full view of glottis  Placement verified by: chest auscultation and capnometry   Cuff volume (mL): 6  Measured from: lips  ETT to lips (cm): 22  Number of attempts at approach: 1

## 2017-08-16 LAB
ALBUMIN SERPL-MCNC: 3.6 G/DL (ref 3.4–4.8)
ALBUMIN/GLOB SERPL: 1.3 G/DL (ref 1.1–1.8)
ALP SERPL-CCNC: 80 U/L (ref 38–126)
ALT SERPL W P-5'-P-CCNC: 63 U/L (ref 21–72)
ANION GAP SERPL CALCULATED.3IONS-SCNC: 11 MMOL/L (ref 5–15)
AST SERPL-CCNC: 58 U/L (ref 17–59)
BASOPHILS # BLD AUTO: 0 10*3/MM3 (ref 0–0.2)
BASOPHILS NFR BLD AUTO: 0 % (ref 0–2)
BILIRUB SERPL-MCNC: 0.7 MG/DL (ref 0.2–1.3)
BUN BLD-MCNC: 9 MG/DL (ref 7–21)
BUN/CREAT SERPL: 9.9 (ref 7–25)
CALCIUM SPEC-SCNC: 8.6 MG/DL (ref 8.4–10.2)
CHLORIDE SERPL-SCNC: 102 MMOL/L (ref 95–110)
CO2 SERPL-SCNC: 25 MMOL/L (ref 22–31)
CREAT BLD-MCNC: 0.91 MG/DL (ref 0.7–1.3)
DEPRECATED RDW RBC AUTO: 44.3 FL (ref 35.1–43.9)
EOSINOPHIL # BLD AUTO: 0 10*3/MM3 (ref 0–0.7)
EOSINOPHIL NFR BLD AUTO: 0 % (ref 0–7)
ERYTHROCYTE [DISTWIDTH] IN BLOOD BY AUTOMATED COUNT: 14.1 % (ref 11.5–14.5)
GFR SERPL CREATININE-BSD FRML MDRD: 97 ML/MIN/1.73 (ref 70–162)
GLOBULIN UR ELPH-MCNC: 2.7 GM/DL (ref 2.3–3.5)
GLUCOSE BLD-MCNC: 102 MG/DL (ref 60–100)
HCT VFR BLD AUTO: 32.7 % (ref 39–49)
HGB BLD-MCNC: 11.3 G/DL (ref 13.7–17.3)
HOLD SPECIMEN: NORMAL
IMM GRANULOCYTES # BLD: 0.03 10*3/MM3 (ref 0–0.02)
IMM GRANULOCYTES NFR BLD: 0.2 % (ref 0–0.5)
LYMPHOCYTES # BLD AUTO: 1.62 10*3/MM3 (ref 0.6–4.2)
LYMPHOCYTES NFR BLD AUTO: 11.3 % (ref 10–50)
MCH RBC QN AUTO: 29.7 PG (ref 26.5–34)
MCHC RBC AUTO-ENTMCNC: 34.6 G/DL (ref 31.5–36.3)
MCV RBC AUTO: 85.8 FL (ref 80–98)
MONOCYTES # BLD AUTO: 1.21 10*3/MM3 (ref 0–0.9)
MONOCYTES NFR BLD AUTO: 8.4 % (ref 0–12)
NEUTROPHILS # BLD AUTO: 11.48 10*3/MM3 (ref 2–8.6)
NEUTROPHILS NFR BLD AUTO: 80.1 % (ref 37–80)
PLATELET # BLD AUTO: 236 10*3/MM3 (ref 150–450)
PMV BLD AUTO: 10.1 FL (ref 8–12)
POTASSIUM BLD-SCNC: 4 MMOL/L (ref 3.5–5.1)
PROT SERPL-MCNC: 6.3 G/DL (ref 6.3–8.6)
RBC # BLD AUTO: 3.81 10*6/MM3 (ref 4.37–5.74)
SODIUM BLD-SCNC: 138 MMOL/L (ref 137–145)
WBC NRBC COR # BLD: 14.34 10*3/MM3 (ref 3.2–9.8)
WHOLE BLOOD HOLD SPECIMEN: NORMAL

## 2017-08-16 PROCEDURE — 85025 COMPLETE CBC W/AUTO DIFF WBC: CPT | Performed by: SURGERY

## 2017-08-16 PROCEDURE — 80053 COMPREHEN METABOLIC PANEL: CPT | Performed by: STUDENT IN AN ORGANIZED HEALTH CARE EDUCATION/TRAINING PROGRAM

## 2017-08-16 PROCEDURE — 25010000002 HYDROMORPHONE PER 4 MG: Performed by: SURGERY

## 2017-08-16 PROCEDURE — 25010000002 ONDANSETRON PER 1 MG: Performed by: SURGERY

## 2017-08-16 PROCEDURE — 99024 POSTOP FOLLOW-UP VISIT: CPT | Performed by: SURGERY

## 2017-08-16 RX ORDER — SODIUM CHLORIDE 0.9 % (FLUSH) 0.9 %
10 SYRINGE (ML) INJECTION AS NEEDED
Status: DISCONTINUED | OUTPATIENT
Start: 2017-08-16 | End: 2017-08-17 | Stop reason: HOSPADM

## 2017-08-16 RX ADMIN — HYDROCODONE BITARTRATE AND ACETAMINOPHEN 1 TABLET: 7.5; 325 TABLET ORAL at 11:57

## 2017-08-16 RX ADMIN — ONDANSETRON 4 MG: 2 INJECTION INTRAMUSCULAR; INTRAVENOUS at 01:58

## 2017-08-16 RX ADMIN — HYDROMORPHONE HYDROCHLORIDE 1 MG: 1 INJECTION, SOLUTION INTRAMUSCULAR; INTRAVENOUS; SUBCUTANEOUS at 03:27

## 2017-08-16 RX ADMIN — HYDROCODONE BITARTRATE AND ACETAMINOPHEN 1 TABLET: 7.5; 325 TABLET ORAL at 16:47

## 2017-08-16 RX ADMIN — POTASSIUM CHLORIDE, DEXTROSE MONOHYDRATE AND SODIUM CHLORIDE 100 ML/HR: 150; 5; 450 INJECTION, SOLUTION INTRAVENOUS at 18:55

## 2017-08-16 RX ADMIN — HYDROCODONE BITARTRATE AND ACETAMINOPHEN 1 TABLET: 7.5; 325 TABLET ORAL at 22:21

## 2017-08-16 RX ADMIN — HYDROCODONE BITARTRATE AND ACETAMINOPHEN 1 TABLET: 7.5; 325 TABLET ORAL at 01:58

## 2017-08-16 RX ADMIN — AMPICILLIN SODIUM AND SULBACTAM SODIUM 3 G: 2; 1 INJECTION, POWDER, FOR SOLUTION INTRAMUSCULAR; INTRAVENOUS at 01:58

## 2017-08-16 RX ADMIN — HYDROMORPHONE HYDROCHLORIDE 1 MG: 1 INJECTION, SOLUTION INTRAMUSCULAR; INTRAVENOUS; SUBCUTANEOUS at 08:04

## 2017-08-16 RX ADMIN — AMPICILLIN SODIUM AND SULBACTAM SODIUM 3 G: 2; 1 INJECTION, POWDER, FOR SOLUTION INTRAMUSCULAR; INTRAVENOUS at 10:35

## 2017-08-16 NOTE — PAYOR COMM NOTE
"Elias Lora (31 y.o. Male)     Date of Birth Social Security Number Address Home Phone MRN    [id number]  [address]  [facility] [address] [phone] [id number]    Yazidi Marital Status          Other        Admission Date Admission Type Admitting Provider Attending Provider Department, Room/Bed    8/15/17 Emergency Ace Parada MD Chapman, Darren C, MD [address], 354/1    Discharge Date Discharge Disposition Discharge Destination                      Attending Provider: Ace Parada MD     Allergies:  No Known Allergies    Isolation:  None   Infection:  None   Code Status:  FULL    Ht:  65\" (165.1 cm)   Wt:  152 lb 9.6 oz (69.2 kg)    Admission Cmt:  None   Principal Problem:  Cholecystitis [K81.9] More...                 Active Insurance as of 8/15/2017     Primary Coverage     Payor Plan Insurance Group Employer/Plan Group    [employer] MEDICAID [employer] [facility]     Payor Plan Address Payor Plan Phone Number Effective From Effective To    PO  [phone] 9/9/2016     [place] [address]       Subscriber Name Subscriber Birth Date Member ID       ELIAS OLRA 1985 [id number]                 Emergency Contacts      (Rel.) Home Phone Work Phone Mobile Phone    Shameka Lora (Spouse) -- -- [phone]               History & Physical      Ace Parada MD at 8/15/2017  7:22 AM              Referring Provider:  Cady Mayo      Patient Care Team:  Simran Rosas MD as PCP - General      Subjective .     History of present illness:   31-year-old male presented to emergency room last evening with an episode of right upper quadrant pain and associated nausea and vomiting.  Patient's had similar episodes to this in the past but this was the more severe.  Pain is completely resolved this morning and no nausea or vomiting and is hungry.  Patient's been afebrile overnight vital signs are stable.  " Denies any history of pancreatitis or being jaundiced.  Has not had any abdominal surgery in the past.  An ultrasound which demonstrates gallstones and a 6 mm common bile duct.  His LFTs were normal    Review of Systems   Constitutional: Negative.    HENT: Negative for hearing loss, nosebleeds and trouble swallowing.    Respiratory: Negative for apnea, chest tightness and shortness of breath.    Cardiovascular: Negative for chest pain and palpitations.   Gastrointestinal: Negative for abdominal distention, abdominal pain, blood in stool, constipation, diarrhea, nausea and vomiting.   Genitourinary: Negative for difficulty urinating, dysuria, frequency and urgency.   Musculoskeletal: Negative for back pain, joint swelling and neck pain.   Skin: Negative for rash.   Neurological: Negative for dizziness, seizures, weakness, light-headedness, numbness and headaches.   Hematological: Negative for adenopathy.   Psychiatric/Behavioral: Negative for agitation. The patient is not nervous/anxious.          History  Past Medical History:   Diagnosis Date   • Astigmatism    • Bone disease    • Chronic pain    • Fibroma of lower extremity    • Myopia    , Past Surgical History:   Procedure Laterality Date   • LEG SURGERY Right 06/07/2017   , Family History   Problem Relation Age of Onset   • Thyroid disease Mother    , Social History   Substance Use Topics   • Smoking status: Never Smoker   • Smokeless tobacco: Never Used   • Alcohol use No   , Prescriptions Prior to Admission   Medication Sig Dispense Refill Last Dose   • aspirin 81 MG chewable tablet Chew 81 mg Daily.   Past Month at Unknown time   • acetic acid-hydrocortisone (VOSOL-HC) 1-2 % otic solution Administer 4 drops into ears 2 (Two) Times a Week. 10 mL 11 Taking   • albuterol (PROVENTIL HFA;VENTOLIN HFA) 108 (90 BASE) MCG/ACT inhaler Inhale 2 puffs Every 6 (Six) Hours As Needed for wheezing. 1 inhaler 2 Taking   , Scheduled Meds:   , Continuous Infusions:     dextrose 5 % and sodium chloride 0.45 % with KCl 20 mEq/L 125 mL/hr Last Rate: 125 mL/hr (08/15/17 0500)   sodium chloride 125 mL/hr Last Rate: Stopped (08/15/17 0500)   , PRN Meds:  sodium chloride and Allergies:  Review of patient's allergies indicates no known allergies.    Objective     Vital Signs   Temp:  [97.9 °F (36.6 °C)-98.8 °F (37.1 °C)] 97.9 °F (36.6 °C)  Heart Rate:  [44-86] 45  Resp:  [16] 16  BP: (119-147)/(67-89) 137/85    Physical Exam:     General Appearance:    Alert, cooperative, in no acute distress   Head:    Normocephalic, without obvious abnormality, atraumatic   Eyes:            Lids and lashes normal, conjunctivae and sclerae normal, no   icterus, no pallor, corneas clear      Nonicteric    Throat:   No oral lesions, no thrush, oral mucosa moist   Neck:   No adenopathy, supple, trachea midline, no thyromegaly,   no JVD   Lungs:     Clear to auscultation,respirations regular, even and                  unlabored    Heart:    Regular rhythm and normal rate, normal S1 and S2, no            murmur, no gallop, no rub, no click   Chest Wall:    No abnormalities observed   Abdomen:     Normal bowel sounds, no masses, no organomegaly, soft        non-tender, non-distended, no guarding, no rebound                tenderness   Extremities:   Moves all extremities well, no edema, no cyanosis, no             redness   Skin:   No bleeding, bruising or rash   Lymph nodes:   No palpable adenopathy   Neurologic:  Grossly intact, sensation intact      Assessment/Plan cholelithiasis and chronic cholecystitis.  I would recommend this patient undergo laparoscopic cholecystectomy and interoperative cholangiogram.  At least a pamphlet and fully instructed him in the procedure alternatives risk and benefits and he clearly understands and wishes to proceed    Active Problems:    Calculus of gallbladder with acute cholecystitis without obstruction             This document has been electronically signed by Ace  CHRISTOPHER Parada MD on August 15, 2017 7:22 AM     Ace Parada MD  08/15/17  7:22 AM            EMR Dragon/Transcription disclaimer:  Much of this encounter note is on electronic transcription/translation of spoken language to printed text.  The electronic translation of spoken language may permit erroneous or at times, nonsensical words or phrases to be inadvertently transcribed;  Although I have reviewed the note for such errors, some may still exist.               Electronically signed by Ace Parada MD at 8/15/2017  7:26 AM           Emergency Department Notes      Regla Haywood, RN at 8/15/2017  1:42 AM          Pt in US at this time.       Regla Haywood RN  08/15/17 0142       Electronically signed by Regla Haywood RN at 8/15/2017  1:42 AM      Rossana Marvin at 8/15/2017  3:18 AM          LOUIS Haywood aware's that pt HR is low     Rossana Marvin  08/15/17 0318       Electronically signed by Rossana Marvin at 8/15/2017  3:18 AM        Vital Signs (last 24 hours)       08/15 0700  -  08/16 0659 08/16 0700  -  08/16 0928   Most Recent    Temp (°F) 96.2 -  98.6      97.2     97.2 (36.2)    Heart Rate 61 -  84    92 -  96     92    Resp 14 -  18      18     18    BP 92/52 -  134/64      102/61     102/61    SpO2 (%) 95 -  100      92     92          Hospital Medications (active)       Dose Frequency Start End    ampicillin-sulbactam 3 g/100 mL 0.9% NS (MBP) 3 g Every 8 Hours 8/15/2017 8/16/2017    Sig - Route: Infuse 100 mL into a venous catheter Every 8 (Eight) Hours. - Intravenous    bupivacaine-EPINEPHrine PF (MARCAINE w/EPI) 0.5% -1:595535 injection  As Needed 8/15/2017     Sig: As Needed.    dextrose 5 % and sodium chloride 0.45 % infusion 100 mL/hr Continuous 8/15/2017     Sig - Route: Infuse 100 mL/hr into a venous catheter Continuous. - Intravenous    dextrose 5 % and sodium chloride 0.45 % with KCl 20 mEq/L infusion 125 mL/hr Continuous 8/15/2017     Sig - Route: Infuse 125 mL/hr into a  "venous catheter Continuous. - Intravenous    dextrose 5 % and sodium chloride 0.45 % with KCl 20 mEq/L infusion 100 mL/hr Continuous 8/15/2017     Sig - Route: Infuse 100 mL/hr into a venous catheter Continuous. - Intravenous    HYDROcodone-acetaminophen (NORCO) 7.5-325 MG per tablet 1 tablet 1 tablet Every 4 Hours PRN 8/15/2017 8/25/2017    Sig - Route: Take 1 tablet by mouth Every 4 (Four) Hours As Needed for Moderate Pain . - Oral    HYDROmorphone (DILAUDID) injection 1 mg 1 mg Every 3 Hours PRN 8/15/2017 8/25/2017    Sig - Route: Infuse 1 mL into a venous catheter Every 3 (Three) Hours As Needed for Severe Pain . - Intravenous    iopamidol (ISOVUE-300) 61 % injection  As Needed 8/15/2017     Sig: As Needed.    naloxone (NARCAN) injection 0.4 mg 0.4 mg Every 5 Minutes PRN 8/15/2017     Sig - Route: Infuse 1 mL into a venous catheter Every 5 (Five) Minutes As Needed for Respiratory Depression. - Intravenous    Linked Group 1:  \"And\" Linked Group Details        ondansetron (ZOFRAN) injection 4 mg 4 mg Every 4 Hours PRN 8/15/2017     Sig - Route: Infuse 2 mL into a venous catheter Every 4 (Four) Hours As Needed for Nausea or Vomiting. - Intravenous    sodium chloride (BACTERIOSTATIC) injection  As Needed 8/15/2017     Sig: As Needed.    sodium chloride 0.9 % flush 10 mL 10 mL As Needed 8/16/2017     Sig - Route: Infuse 10 mL into a venous catheter As Needed for Line Care. - Intravenous    Cosign for Ordering: Accepted by Ace Parada MD on 8/16/2017  7:10 AM    sodium chloride 0.9 % infusion 125 mL/hr Continuous 8/15/2017     Sig - Route: Infuse 125 mL/hr into a venous catheter Continuous. - Intravenous    ceFAZolin (ANCEF) injection (Discontinued)  As Needed 8/15/2017 8/15/2017    Sig: As Needed.    Reason for Discontinue: Anesthesia Stop    dexamethasone (DECADRON) injection (Discontinued)  As Needed 8/15/2017 8/15/2017    Sig - Route: Infuse  into a venous catheter As Needed. - Intravenous    Reason for " "Discontinue: Anesthesia Stop    electrolyte-148 (PLASMALYTE) solution (Discontinued)  Continuous PRN 8/15/2017 8/15/2017    Sig: Continuous As Needed.    Reason for Discontinue: Anesthesia Stop    fentaNYL citrate (PF) (SUBLIMAZE) injection (Discontinued)  As Needed 8/15/2017 8/15/2017    Sig: As Needed for Severe Pain .    Reason for Discontinue: Anesthesia Stop    HYDROmorphone (DILAUDID) injection 0.5 mg (Discontinued) 0.5 mg Every 5 Minutes PRN 8/15/2017 8/15/2017    Sig - Route: Infuse 0.5 mL into a venous catheter Every 5 (Five) Minutes As Needed for Severe Pain . - Intravenous    Reason for Discontinue: Patient Transfer    HYDROmorphone (DILAUDID) injection (Discontinued)  As Needed 8/15/2017 8/15/2017    Sig - Route: Infuse  into a venous catheter As Needed for Severe Pain . - Intravenous    Reason for Discontinue: Anesthesia Stop    lidocaine (XYLOCAINE) 2% injection (Discontinued)  As Needed 8/15/2017 8/15/2017    Sig: As Needed.    Reason for Discontinue: Anesthesia Stop    meperidine (DEMEROL) injection 12.5 mg (Discontinued) 12.5 mg Every 5 Minutes PRN 8/15/2017 8/15/2017    Sig - Route: Infuse 0.25 mL into a venous catheter Every 5 (Five) Minutes As Needed for Shivering (May repeat x 1). - Intravenous    Reason for Discontinue: Patient Transfer    metoprolol tartrate (LOPRESSOR) injection (Discontinued)  As Needed 8/15/2017 8/15/2017    Sig: As Needed.    Reason for Discontinue: Anesthesia Stop    midazolam (VERSED) injection (Discontinued)  As Needed 8/15/2017 8/15/2017    Sig - Route: Infuse  into a venous catheter As Needed. - Intravenous    Reason for Discontinue: Anesthesia Stop    Morphine sulfate (PF) injection 4 mg (Discontinued) 4 mg Every 2 Hours PRN 8/15/2017 8/15/2017    Sig - Route: Infuse 1 mL into a venous catheter Every 2 (Two) Hours As Needed for Moderate Pain . - Intravenous    Linked Group 1:  \"And\" Linked Group Details        ondansetron (ZOFRAN) injection (Discontinued)  As Needed " 8/15/2017 8/15/2017    Sig - Route: Infuse  into a venous catheter As Needed for Nausea or Vomiting. - Intravenous    Reason for Discontinue: Anesthesia Stop    Propofol (DIPRIVAN) injection (Discontinued)  As Needed 8/15/2017 8/15/2017    Sig - Route: Infuse  into a venous catheter As Needed. - Intravenous    Reason for Discontinue: Anesthesia Stop    rocuronium (ZEMURON) injection (Discontinued)  As Needed 8/15/2017 8/15/2017    Sig - Route: Infuse  into a venous catheter As Needed. - Intravenous    Reason for Discontinue: Anesthesia Stop    sodium chloride 0.9 % flush 10 mL (Discontinued) 10 mL As Needed 8/15/2017 8/16/2017    Sig - Route: Infuse 10 mL into a venous catheter As Needed for Line Care. - Intravenous    Cosign for Ordering: Accepted by Rajesh Mayo MD on 8/15/2017 12:41 AM          Orders (last 24 hrs)     Start     Ordered    08/16/17 0928  Inpatient Admission  Once      08/16/17 0927    08/16/17 0757  Diet Regular  Diet Effective Now      08/16/17 0756    08/16/17 0746  Extra Tubes  Once      08/16/17 0745    08/16/17 0746  Gold Top - SST  PROCEDURE ONCE      08/16/17 0745    08/16/17 0741  Extra Tubes  Once      08/16/17 0741    08/16/17 0741  Lavender Top  PROCEDURE ONCE      08/16/17 0741    08/16/17 0741  Green Top (Gel)  PROCEDURE ONCE,   Status:  Canceled      08/16/17 0741    08/16/17 0708  sodium chloride 0.9 % flush 10 mL  As Needed      08/16/17 0709    08/16/17 0706  Comprehensive Metabolic Panel  Once      08/16/17 0706    08/16/17 0600  CBC & Differential  Morning Draw      08/15/17 1732    08/16/17 0600  CBC Auto Differential  PROCEDURE ONCE      08/16/17 0000    08/15/17 1935  ondansetron (ZOFRAN) injection 4 mg  Every 4 Hours PRN      08/15/17 1935    08/15/17 1934  HYDROmorphone (DILAUDID) injection 1 mg  Every 3 Hours PRN      08/15/17 1935    08/15/17 1815  dextrose 5 % and sodium chloride 0.45 % with KCl 20 mEq/L infusion  Continuous      08/15/17 1856    08/15/17 2568   ampicillin-sulbactam 3 g/100 mL 0.9% NS (MBP)  Every 8 Hours      08/15/17 1732    08/15/17 1800  Strict Intake and Output  Every Hour      08/15/17 1732    08/15/17 1733  Discharge patient  Once,   Status:  Canceled      08/15/17 1732    08/15/17 1733  Notify physician (specify)  Until Discontinued      08/15/17 1732    08/15/17 1733  Turn, Cough & Deep Breathe  Once      08/15/17 1732    08/15/17 1733  Ambulate Patient  Every Shift      08/15/17 1732    08/15/17 1733  Dangle Feet Off Bed Tonight  Continuous      08/15/17 1732    08/15/17 1733  Diet Clear Liquid  Diet Effective Now,   Status:  Canceled      08/15/17 1732    08/15/17 1732  Morphine sulfate (PF) injection 4 mg  Every 2 Hours PRN,   Status:  Discontinued      08/15/17 1732    08/15/17 1732  naloxone (NARCAN) injection 0.4 mg  Every 5 Minutes PRN      08/15/17 1732    08/15/17 1732  HYDROcodone-acetaminophen (NORCO) 7.5-325 MG per tablet 1 tablet  Every 4 Hours PRN      08/15/17 1732    08/15/17 1637  Nursing Communication  care and record output.  Continuous     Comments:   care and record output.    08/15/17 1636    08/15/17 1636  OSCILLATING POSITIVE EXIRATORY PRESSURE (OPEP)  Once      08/15/17 1636    08/15/17 1629  Oxygen Therapy- Nasal Cannula; 2 LPM; Titrate for SPO2: equal to or greater than, per policy, 92%  Continuous      08/15/17 1628    08/15/17 1623  Vital signs every 5 minutes for 15 minutes, every 15 minutes thereafter.  Once,   Status:  Canceled      08/15/17 1622    08/15/17 1623  Continue OR fluids  Until Discontinued,   Status:  Canceled      08/15/17 1622    08/15/17 1623  Notify Anesthesia of Any Acute Changes in Patient Condition  Until Discontinued,   Status:  Canceled      08/15/17 1622    08/15/17 1623  Notify Anesthesia for Unrelieved Pain  Until Discontinued,   Status:  Canceled      08/15/17 1622    08/15/17 1623  Pulse Oximetry, Continuous  Continuous,   Status:  Canceled      08/15/17 1622    08/15/17 1623  Once DC  criteria to floor met, follow surgeon's orders.  Continuous,   Status:  Canceled      08/15/17 1622    08/15/17 1623  Discharge patient from PACU when discharge criteria is met.  Continuous,   Status:  Canceled      08/15/17 1622    08/15/17 1622  HYDROmorphone (DILAUDID) injection 0.5 mg  Every 5 Minutes PRN,   Status:  Discontinued      08/15/17 1622    08/15/17 1622  meperidine (DEMEROL) injection 12.5 mg  Every 5 Minutes PRN,   Status:  Discontinued      08/15/17 1622    08/15/17 1555  Tissue Pathology Exam     Comments:  Specimen A: PATIENT WISHES TO KEEP ANY GALLSTONES- PLEASE RETURN TO PATIENT    08/15/17 1555    08/15/17 1454  iopamidol (ISOVUE-300) 61 % injection  As Needed      08/15/17 1454    08/15/17 1454  sodium chloride (BACTERIOSTATIC) injection  As Needed      08/15/17 1455    08/15/17 1453  bupivacaine-EPINEPHrine PF (MARCAINE w/EPI) 0.5% -1:883154 injection  As Needed      08/15/17 1454    08/15/17 1413  FL C Arm During Surgery  1 Time Imaging,   Status:  Canceled      08/15/17 1413    08/15/17 1242  Follow Anesthesia Guidelines / Standing Orders  Once,   Status:  Canceled      08/15/17 1241    08/15/17 0815  dextrose 5 % and sodium chloride 0.45 % infusion  Continuous      08/15/17 0736    08/15/17 0219  dextrose 5 % and sodium chloride 0.45 % with KCl 20 mEq/L infusion  Continuous      08/15/17 0218    08/15/17 0043  sodium chloride 0.9 % infusion  Continuous      08/15/17 0041    08/15/17 0038  sodium chloride 0.9 % flush 10 mL  As Needed,   Status:  Discontinued      08/15/17 0039    Unscheduled  Vital signs  As Needed      08/15/17 1732    Unscheduled  Up in Chair  As Needed      08/15/17 1732    --  SCANNED - TELEMETRY        08/15/17 0000    --  SCANNED - TELEMETRY        08/15/17 0000             Operative/Procedure Notes (last 24 hours) (Notes from 8/15/2017  9:28 AM through 8/16/2017  9:28 AM)      Ace Parada MD at 8/15/2017  4:20 PM  Version 2 of 2         CHOLECYSTECTOMY  LAPAROSCOPIC INTRAOPERATIVE CHOLANGIOGRAM  Procedure Note    Giacomo Yanez  8/15/2017    Pre-op Diagnosis:   Cholecystitis [K81.9]    Post-op Diagnosis:     Post-Op Diagnosis Codes:     * Cholecystitis [K81.9]    Procedure/CPT® Codes:      Procedure(s):  Subtotal open cholecystectomy  TAP block    Surgeon(s):  Aec Parada MD    Anesthesia: General    Staff:   Circulator: Brittany Spears RN  Scrub Person: Louise Tello  Endo Technician: Alexia Bhat CST  Assistant: Coral Ortiz CSA    Estimated Blood Loss: *No blood loss documented*    Specimens:                  ID Type Source Tests Collected by Time Destination   A : PATIENT WISHES TO KEEP ANY GALLSTONES- PLEASE RETURN TO PATIENT Tissue Gallbladder TISSUE EXAM Ace Parada MD 8/15/2017 1554          Drains:  10 mm Peter-Gaming drain  Drain/Device Site 08/15/17 1556 Right lateral upper quadrant other (see comments) 1 (Active)           Indications: 31 -year-old male admitted late last night with acute cholecystitis.  LFTs normal white count normal.  Patient's pain resolved with IV fluids and antibiotics.  Ultrasound demonstrated gallstones.  Presents now for laparoscopic cholecystectomy and interoperative cholangiogram    Findings:  Chronically acutely inflamed gallbladder with omentum densely adherent to the gallbladder along with the duodenum adherent to the gallbladder as well.  Converted to open due to the fact that I could not develop a tissue plane to free the gallbladder up anteriorly.  No cholangiogram performed.  Cystic duct was closed from within the neck of the gallbladder.  2 cm stone at the gallbladder and a couple 1.5 cm stones up in the body of this contracted gallbladder.  Left back wall of gallbladder.    Complications: None    Procedure: The patient was brought to the operating room and was placed under general endotracheal anesthesia without difficulty, had SCDs in place and received Ancef IV antibiotics preoperatively.  His abdomen was prepped and draped in the normal sterile fashion. Appropriate timeout was taken. A cutdown was performed at the supraumbilical position and a 10/11 Sushma trocar was placed without difficulty. Adequate pneumoperitoneum was obtained. TAP abdominal wall block was then performed with 30 mL of 0.5% Marcaine with 20 mL injected on the right side of the abdomen and 10 mL on the left side using the laparoscope for guidance. Once that was done, we then attempted to free up the gallbladder. We could see a small portion of the very top of the gallbladder, we grasped that and then attempted to peel the omentum off of the gallbladder but there was no real plane there and there was significant bleeding as we tried to peel this off the gallbladder. We attempted initially with blunt dissection and then attempted using the Harmonic scalpel, but again could not develop a plane freeing the gallbladder up from the omentum. We did not attempt to go down to the neck of the gallbladder. We felt that the safest thing to do was to open the patient at this point, which we did. We removed our trocars and the fascia at the cutdown site was closed with 2-0 Vicryl figure-of-eight stitch. We then made our skin incision connecting at our trocar sites subcostal margin sharply and followed it down to the subcutaneous tissue with electrocautery. We continue dissection with cauterer and entered the abdominal wall again with electrocautery without difficulty. Once inside the abdomen we explored the right upper quadrant. The gallbladder was contracted with omentum densely adherent to it. We began by freeing the omentum up from the gallbladder in a dome-down type technique.   the duodenum it was somewhat adherent to this. We were able to push that away using gentle blunt dissection. There was no connection to the duodenum. It appeared to be a contracted gallbladder with a fairly large, about 2 cm, stone in the neck of the gallbladder and  some smaller 1.5 cm stones up in the body of the gallbladder. We attempted  to take the gallbladder down off of the bed of the liver in a dome-down type technique, but there was no plane to be developed so at that point we elected to go ahead and open the gallbladder and the wall appeared to be somewhat thickened and contracted. We went ahead and divided the exposed wall of the gallbladder using electrocautery and removed all of the stones. In the neck of the gallbladder we removed the larger stone. There did not appear to be any real bile within the gallbladder. We did not attempt to take the back wall down or the back wall of the neck of the gallbladder, we left that in place as well. We identified what we thought was the opening to the cystic duct and we closed that with a figure-of-eight stitch of 3-0 Vicryl. The bleeding was controlled at the edges of the gallbladder with electrocautery. We copiously irrigated and good hemostasis was noted. No evidence of any bile leak. We then brought a 10 mm Peter-Gaming drain out through a separate stab incision lateral to our incision. We laid it in the bed of the gallbladder where resection had occurred and secured it at the skin with a 2-0 Silk suture. Again, we inspected the drain and it appeared to be in good position, and had good hemostasis. Bleeding was well-controlled. We closed our fascia in 2 layers with looped #1 PDS running suture. Subcutaneous was closed with 2-0 Vicryl figure-of-eight stitch. Skin was closed with staples at both sites. Dressing was applied. The patient was awakened, extubated and transferred to the recovery room in stable condition.                                    Disposition:  Has further currently awake stable condition.  Patient will be transferred back to the floor.  I spoke with his wife and explained the findings.      EMR Dragon/Transcription disclaimer:  Much of this encounter note is on electronic transcription/translation of  spoken language to printed text.  The electronic translation of spoken language may permit erroneous or at times, nonsensical words or phrases to be inadvertently transcribed;  Although I have reviewed the note for such errors, some may still exist.            Ace Parada MD     Date: 8/15/2017  Time: 4:20 PM         Electronically signed by Ace Parada MD at 8/16/2017  7:17 AM      Ace Parada MD at 8/15/2017  4:20 PM  Version 1 of 2         CHOLECYSTECTOMY LAPAROSCOPIC INTRAOPERATIVE CHOLANGIOGRAM  Procedure Note    Giacomo Yanez  8/15/2017    Pre-op Diagnosis:   Cholecystitis [K81.9]    Post-op Diagnosis:     Post-Op Diagnosis Codes:     * Cholecystitis [K81.9]    Procedure/CPT® Codes:      Procedure(s):  Subtotal open cholecystectomy  TAP block    Surgeon(s):  Ace Parada MD    Anesthesia: General    Staff:   Circulator: Brittany Spears RN  Scrub Person: Louise Tello  Endo Technician: Alexia Bhat CST  Assistant: Coral Ortiz CSA    Estimated Blood Loss: *No blood loss documented*    Specimens:                  ID Type Source Tests Collected by Time Destination   A : PATIENT WISHES TO KEEP ANY GALLSTONES- PLEASE RETURN TO PATIENT Tissue Gallbladder TISSUE EXAM Ace Parada MD 8/15/2017 1554          Drains:  10 mm Peter-Gaming drain  Drain/Device Site 08/15/17 1556 Right lateral upper quadrant other (see comments) 1 (Active)           Indications: 31 -year-old male admitted late last night with acute cholecystitis.  LFTs normal white count normal.  Patient's pain resolved with IV fluids and antibiotics.  Ultrasound demonstrated gallstones.  Presents now for laparoscopic cholecystectomy and interoperative cholangiogram    Findings:  Chronically acutely inflamed gallbladder with omentum densely adherent to the gallbladder along with the duodenum adherent to the gallbladder as well.  Converted to open due to the fact that I could not develop a tissue plane to free the  gallbladder up anteriorly.  No cholangiogram performed.  Cystic duct was closed from within the neck of the gallbladder.  2 cm stone at the gallbladder and a couple 1.5 cm stones up in the body of this contracted gallbladder.  Left back wall of gallbladder.    Complications: None    Procedure:  Dictation on: 08/15/2017  4:35 PM by: ACE PARADA [[id number]]               Disposition:  Has further currently awake stable condition.  Patient will be transferred back to the floor.  I spoke with his wife and explained the findings.      EMR Dragon/Transcription disclaimer:  Much of this encounter note is on electronic transcription/translation of spoken language to printed text.  The electronic translation of spoken language may permit erroneous or at times, nonsensical words or phrases to be inadvertently transcribed;  Although I have reviewed the note for such errors, some may still exist.            Ace Parada MD     Date: 8/15/2017  Time: 4:20 PM         Electronically signed by Ace Parada MD at 8/15/2017  4:35 PM           Physician Progress Notes (last 24 hours) (Notes from 8/15/2017  9:28 AM through 8/16/2017  9:28 AM)      Enrique Engel MD at 8/16/2017  8:48 AM  Version 1 of 1         Progress Note    HPI: Mr. Yanez is a 31 year old gentleman on post-op day #1 s/p Laparoscopic Cholecystectomy. No acute events overnight. He is recovering nicely. He tried to eat last night, but became nauseous and vomited. He tolerated water and apple juice though without problems. He is passing gas this morning. No BM yet. He is able to eat some food. No nausea or vomiting since last night. He is ambulating around his room and hospital floor.      Review of Systems   Constitutional: Negative for activity change, appetite change, chills, diaphoresis, fatigue and fever.   HENT: Negative for congestion, rhinorrhea and sore throat.    Eyes: Negative for pain, redness and visual disturbance.   Respiratory: Negative  for apnea, cough, choking, chest tightness, shortness of breath and wheezing.    Cardiovascular: Negative for chest pain, palpitations and leg swelling.   Gastrointestinal: Positive for abdominal pain. Negative for abdominal distention, constipation, diarrhea, nausea and vomiting.   Endocrine: Negative for polydipsia and polyuria.   Genitourinary: Negative for difficulty urinating, dysuria, flank pain and frequency.   Skin: Positive for wound. Negative for color change, pallor and rash.   Neurological: Negative for dizziness, tremors, weakness, light-headedness, numbness and headaches.   Psychiatric/Behavioral: Negative for agitation, behavioral problems, confusion, decreased concentration and sleep disturbance. The patient is not nervous/anxious.    All other systems reviewed and are negative.      Scheduled Meds:    ampicillin-sulbactam 3 g Intravenous Q8H     Continuous Meds:    dextrose 5 % and sodium chloride 0.45 % 100 mL/hr Last Rate: Stopped (08/15/17 1511)   dextrose 5 % and sodium chloride 0.45 % with KCl 20 mEq/L 125 mL/hr Last Rate: Stopped (08/15/17 0820)   dextrose 5 % and sodium chloride 0.45 % with KCl 20 mEq/L 100 mL/hr Last Rate: 100 mL/hr (08/16/17 0843)   sodium chloride 125 mL/hr Last Rate: Stopped (08/15/17 0500)     Meds PRN:  bupivacaine-EPINEPHrine PF  •  HYDROcodone-acetaminophen  •  HYDROmorphone  •  iopamidol  •  [DISCONTINUED] Morphine **AND** naloxone  •  ondansetron  •  sodium chloride  •  sodium chloride    WBC   Date Value Ref Range Status   08/16/2017 14.34 (H) 3.20 - 9.80 10*3/mm3 Final     RBC   Date Value Ref Range Status   08/16/2017 3.81 (L) 4.37 - 5.74 10*6/mm3 Final     Hemoglobin   Date Value Ref Range Status   08/16/2017 11.3 (L) 13.7 - 17.3 g/dL Final     Hematocrit   Date Value Ref Range Status   08/16/2017 32.7 (L) 39.0 - 49.0 % Final     MCV   Date Value Ref Range Status   08/16/2017 85.8 80.0 - 98.0 fL Final     MCH   Date Value Ref Range Status   08/16/2017 29.7 26.5  - 34.0 pg Final     MCHC   Date Value Ref Range Status   08/16/2017 34.6 31.5 - 36.3 g/dL Final     RDW   Date Value Ref Range Status   08/16/2017 14.1 11.5 - 14.5 % Final     RDW-SD   Date Value Ref Range Status   08/16/2017 44.3 (H) 35.1 - 43.9 fl Final     MPV   Date Value Ref Range Status   08/16/2017 10.1 8.0 - 12.0 fL Final     Platelets   Date Value Ref Range Status   08/16/2017 236 150 - 450 10*3/mm3 Final     Neutrophil %   Date Value Ref Range Status   08/16/2017 80.1 (H) 37.0 - 80.0 % Final     Lymphocyte %   Date Value Ref Range Status   08/16/2017 11.3 10.0 - 50.0 % Final     Monocyte %   Date Value Ref Range Status   08/16/2017 8.4 0.0 - 12.0 % Final     Eosinophil %   Date Value Ref Range Status   08/16/2017 0.0 0.0 - 7.0 % Final     Basophil %   Date Value Ref Range Status   08/16/2017 0.0 0.0 - 2.0 % Final     Immature Grans %   Date Value Ref Range Status   08/16/2017 0.2 0.0 - 0.5 % Final     Neutrophils, Absolute   Date Value Ref Range Status   08/16/2017 11.48 (H) 2.00 - 8.60 10*3/mm3 Final     Lymphocytes, Absolute   Date Value Ref Range Status   08/16/2017 1.62 0.60 - 4.20 10*3/mm3 Final     Monocytes, Absolute   Date Value Ref Range Status   08/16/2017 1.21 (H) 0.00 - 0.90 10*3/mm3 Final     Eosinophils, Absolute   Date Value Ref Range Status   08/16/2017 0.00 0.00 - 0.70 10*3/mm3 Final     Basophils, Absolute   Date Value Ref Range Status   08/16/2017 0.00 0.00 - 0.20 10*3/mm3 Final     Immature Grans, Absolute   Date Value Ref Range Status   08/16/2017 0.03 (H) 0.00 - 0.02 10*3/mm3 Final     Lab Results   Component Value Date    GLUCOSE 102 (H) 08/16/2017    BUN 9 08/16/2017    CREATININE 0.91 08/16/2017    EGFRIFNONA 97 08/16/2017    BCR 9.9 08/16/2017    K 4.0 08/16/2017    CO2 25.0 08/16/2017    CALCIUM 8.6 08/16/2017    ALBUMIN 3.60 08/16/2017    LABIL2 1.3 08/16/2017    AST 58 08/16/2017    ALT 63 08/16/2017       Vitals:    08/15/17 2359 08/16/17 0305 08/16/17 0709 08/16/17 0847   BP:   103/66  102/61   BP Location:  Right arm  Right arm   Patient Position:  Sitting     Pulse: 82 71 96 92   Resp: 16 18  18   Temp:  97.3 °F (36.3 °C)  97.2 °F (36.2 °C)   TempSrc:  Oral  Oral   SpO2: 97% 99%  92%   Weight:       Height:           Physical Exam   Constitutional: He is oriented to person, place, and time. He appears well-developed and well-nourished.  Non-toxic appearance. He does not have a sickly appearance. He does not appear ill. No distress.   HENT:   Head: Normocephalic and atraumatic.   Right Ear: External ear normal.   Left Ear: External ear normal.   Nose: Nose normal.   Eyes: Conjunctivae and EOM are normal. No scleral icterus.   Neck: No tracheal deviation present. No thyromegaly present.   Cardiovascular: Normal rate, regular rhythm, normal heart sounds and intact distal pulses.  Exam reveals no gallop and no friction rub.    No murmur heard.  Pulmonary/Chest: Effort normal and breath sounds normal. No respiratory distress. He has no wheezes. He has no rales. He exhibits no tenderness.   Abdominal: Soft. Bowel sounds are normal. He exhibits no distension and no mass. There is tenderness in the epigastric area. There is no rebound and no guarding. No hernia.       Lymphadenopathy:     He has no cervical adenopathy.   Neurological: He is alert and oriented to person, place, and time.   Skin: Skin is warm and dry. No rash noted. He is not diaphoretic. No erythema. No pallor.   Psychiatric: He has a normal mood and affect. His behavior is normal. Judgment and thought content normal.   Nursing note and vitals reviewed.          Assessment/Plan:    Patient is on post-op day #1 s/p Laparoscopic Cholecystectomy recovering nicely. Advancing his diet to regular diet this morning. Incision site does not appear to be infected. Patient is to continue ambulating around his room and the hospital floor. Patient's ALT and AST were within reference range.          This document has been electronically  signed by Enrique Engel MD on August 16, 2017 9:06 AM         Electronically signed by Enrique Engel MD at 8/16/2017  9:07 AM        Consult Notes (last 24 hours) (Notes from 8/15/2017  9:28 AM through 8/16/2017  9:28 AM)     No notes of this type exist for this encounter.      Lexi Gomes RN [facility]  [phone]  Fax [phone]

## 2017-08-16 NOTE — PROGRESS NOTES
Progress Note    HPI: Mr. Yanez is a 31 year old gentleman on post-op day #1 s/p Laparoscopic Cholecystectomy. No acute events overnight. He is recovering nicely. He tried to eat last night, but became nauseous and vomited. He tolerated water and apple juice though without problems. He is passing gas this morning. No BM yet. He is able to eat some food. No nausea or vomiting since last night. He is ambulating around his room and hospital floor.      Review of Systems   Constitutional: Negative for activity change, appetite change, chills, diaphoresis, fatigue and fever.   HENT: Negative for congestion, rhinorrhea and sore throat.    Eyes: Negative for pain, redness and visual disturbance.   Respiratory: Negative for apnea, cough, choking, chest tightness, shortness of breath and wheezing.    Cardiovascular: Negative for chest pain, palpitations and leg swelling.   Gastrointestinal: Positive for abdominal pain. Negative for abdominal distention, constipation, diarrhea, nausea and vomiting.   Endocrine: Negative for polydipsia and polyuria.   Genitourinary: Negative for difficulty urinating, dysuria, flank pain and frequency.   Skin: Positive for wound. Negative for color change, pallor and rash.   Neurological: Negative for dizziness, tremors, weakness, light-headedness, numbness and headaches.   Psychiatric/Behavioral: Negative for agitation, behavioral problems, confusion, decreased concentration and sleep disturbance. The patient is not nervous/anxious.    All other systems reviewed and are negative.      Scheduled Meds:    ampicillin-sulbactam 3 g Intravenous Q8H     Continuous Meds:    dextrose 5 % and sodium chloride 0.45 % 100 mL/hr Last Rate: Stopped (08/15/17 1511)   dextrose 5 % and sodium chloride 0.45 % with KCl 20 mEq/L 125 mL/hr Last Rate: Stopped (08/15/17 0820)   dextrose 5 % and sodium chloride 0.45 % with KCl 20 mEq/L 100 mL/hr Last Rate: 100 mL/hr (08/16/17 0843)   sodium chloride 125 mL/hr Last  Rate: Stopped (08/15/17 0500)     Meds PRN:  bupivacaine-EPINEPHrine PF  •  HYDROcodone-acetaminophen  •  HYDROmorphone  •  iopamidol  •  [DISCONTINUED] Morphine **AND** naloxone  •  ondansetron  •  sodium chloride  •  sodium chloride    WBC   Date Value Ref Range Status   08/16/2017 14.34 (H) 3.20 - 9.80 10*3/mm3 Final     RBC   Date Value Ref Range Status   08/16/2017 3.81 (L) 4.37 - 5.74 10*6/mm3 Final     Hemoglobin   Date Value Ref Range Status   08/16/2017 11.3 (L) 13.7 - 17.3 g/dL Final     Hematocrit   Date Value Ref Range Status   08/16/2017 32.7 (L) 39.0 - 49.0 % Final     MCV   Date Value Ref Range Status   08/16/2017 85.8 80.0 - 98.0 fL Final     MCH   Date Value Ref Range Status   08/16/2017 29.7 26.5 - 34.0 pg Final     MCHC   Date Value Ref Range Status   08/16/2017 34.6 31.5 - 36.3 g/dL Final     RDW   Date Value Ref Range Status   08/16/2017 14.1 11.5 - 14.5 % Final     RDW-SD   Date Value Ref Range Status   08/16/2017 44.3 (H) 35.1 - 43.9 fl Final     MPV   Date Value Ref Range Status   08/16/2017 10.1 8.0 - 12.0 fL Final     Platelets   Date Value Ref Range Status   08/16/2017 236 150 - 450 10*3/mm3 Final     Neutrophil %   Date Value Ref Range Status   08/16/2017 80.1 (H) 37.0 - 80.0 % Final     Lymphocyte %   Date Value Ref Range Status   08/16/2017 11.3 10.0 - 50.0 % Final     Monocyte %   Date Value Ref Range Status   08/16/2017 8.4 0.0 - 12.0 % Final     Eosinophil %   Date Value Ref Range Status   08/16/2017 0.0 0.0 - 7.0 % Final     Basophil %   Date Value Ref Range Status   08/16/2017 0.0 0.0 - 2.0 % Final     Immature Grans %   Date Value Ref Range Status   08/16/2017 0.2 0.0 - 0.5 % Final     Neutrophils, Absolute   Date Value Ref Range Status   08/16/2017 11.48 (H) 2.00 - 8.60 10*3/mm3 Final     Lymphocytes, Absolute   Date Value Ref Range Status   08/16/2017 1.62 0.60 - 4.20 10*3/mm3 Final     Monocytes, Absolute   Date Value Ref Range Status   08/16/2017 1.21 (H) 0.00 - 0.90 10*3/mm3  Final     Eosinophils, Absolute   Date Value Ref Range Status   08/16/2017 0.00 0.00 - 0.70 10*3/mm3 Final     Basophils, Absolute   Date Value Ref Range Status   08/16/2017 0.00 0.00 - 0.20 10*3/mm3 Final     Immature Grans, Absolute   Date Value Ref Range Status   08/16/2017 0.03 (H) 0.00 - 0.02 10*3/mm3 Final     Lab Results   Component Value Date    GLUCOSE 102 (H) 08/16/2017    BUN 9 08/16/2017    CREATININE 0.91 08/16/2017    EGFRIFNONA 97 08/16/2017    BCR 9.9 08/16/2017    K 4.0 08/16/2017    CO2 25.0 08/16/2017    CALCIUM 8.6 08/16/2017    ALBUMIN 3.60 08/16/2017    LABIL2 1.3 08/16/2017    AST 58 08/16/2017    ALT 63 08/16/2017       Vitals:    08/15/17 2359 08/16/17 0305 08/16/17 0709 08/16/17 0847   BP:  103/66  102/61   BP Location:  Right arm  Right arm   Patient Position:  Sitting     Pulse: 82 71 96 92   Resp: 16 18  18   Temp:  97.3 °F (36.3 °C)  97.2 °F (36.2 °C)   TempSrc:  Oral  Oral   SpO2: 97% 99%  92%   Weight:       Height:           Physical Exam   Constitutional: He is oriented to person, place, and time. He appears well-developed and well-nourished.  Non-toxic appearance. He does not have a sickly appearance. He does not appear ill. No distress.   HENT:   Head: Normocephalic and atraumatic.   Right Ear: External ear normal.   Left Ear: External ear normal.   Nose: Nose normal.   Eyes: Conjunctivae and EOM are normal. No scleral icterus.   Neck: No tracheal deviation present. No thyromegaly present.   Cardiovascular: Normal rate, regular rhythm, normal heart sounds and intact distal pulses.  Exam reveals no gallop and no friction rub.    No murmur heard.  Pulmonary/Chest: Effort normal and breath sounds normal. No respiratory distress. He has no wheezes. He has no rales. He exhibits no tenderness.   Abdominal: Soft. Bowel sounds are normal. He exhibits no distension and no mass. There is tenderness in the epigastric area. There is no rebound and no guarding. No hernia.        Lymphadenopathy:     He has no cervical adenopathy.   Neurological: He is alert and oriented to person, place, and time.   Skin: Skin is warm and dry. No rash noted. He is not diaphoretic. No erythema. No pallor.   Psychiatric: He has a normal mood and affect. His behavior is normal. Judgment and thought content normal.   Nursing note and vitals reviewed.          Assessment/Plan:    Patient is on post-op day #1 s/p Laparoscopic Cholecystectomy recovering nicely. Advancing his diet to regular diet this morning. Incision site does not appear to be infected. Patient is to continue ambulating around his room and the hospital floor. Patient's ALT and AST were within reference range.          This document has been electronically signed by Enrique Engel MD on August 16, 2017 9:06 AM        Agree with the above note.  We'll advance his diet.  Drainage output is serosanguineous with minimal to no bile present.  LFTs were normal today.  Patient feels much better and looks much better.  He is nonicteric and he has incisional tenderness but otherwise his abdomen is unremarkable.  We'll continue his current status otherwise

## 2017-08-17 VITALS
DIASTOLIC BLOOD PRESSURE: 63 MMHG | OXYGEN SATURATION: 97 % | WEIGHT: 152.6 LBS | TEMPERATURE: 98 F | SYSTOLIC BLOOD PRESSURE: 104 MMHG | BODY MASS INDEX: 25.43 KG/M2 | HEIGHT: 65 IN | HEART RATE: 87 BPM | RESPIRATION RATE: 18 BRPM

## 2017-08-17 LAB
LAB AP CASE REPORT: NORMAL
LAB AP CLINICAL INFORMATION: NORMAL
Lab: NORMAL
PATH REPORT.FINAL DX SPEC: NORMAL
PATH REPORT.GROSS SPEC: NORMAL

## 2017-08-17 RX ORDER — HYDROCODONE BITARTRATE AND ACETAMINOPHEN 7.5; 325 MG/1; MG/1
1 TABLET ORAL EVERY 6 HOURS PRN
Qty: 30 TABLET | Refills: 0 | Status: SHIPPED | OUTPATIENT
Start: 2017-08-17 | End: 2017-09-11

## 2017-08-17 RX ADMIN — POTASSIUM CHLORIDE, DEXTROSE MONOHYDRATE AND SODIUM CHLORIDE 100 ML/HR: 150; 5; 450 INJECTION, SOLUTION INTRAVENOUS at 05:22

## 2017-08-17 RX ADMIN — HYDROCODONE BITARTRATE AND ACETAMINOPHEN 1 TABLET: 7.5; 325 TABLET ORAL at 07:30

## 2017-08-17 RX ADMIN — HYDROCODONE BITARTRATE AND ACETAMINOPHEN 1 TABLET: 7.5; 325 TABLET ORAL at 02:04

## 2017-08-17 NOTE — DISCHARGE SUMMARY
"  Subjective:   Doing well eating pain well controlled.  No nausea no vomiting     /63 (BP Location: Right arm, Patient Position: Lying)  Pulse 87  Temp 98 °F (36.7 °C) (Oral)   Resp 18  Ht 65\" (165.1 cm)  Wt 152 lb 9.6 oz (69.2 kg)  SpO2 97%  BMI 25.39 kg/m2    Lab Results (last 24 hours)     Procedure Component Value Units Date/Time    Extra Tubes [[id number]] Collected:  08/16/17 0725    Specimen:  Blood from Blood, Venous Line Updated:  08/16/17 0901    Narrative:       The following orders were created for panel order Extra Tubes.  Procedure                               Abnormality         Status                     ---------                               -----------         ------                     Lavender Top[[id number]]                                     Final result               Green Top (Gel)[[id number]]                                                               Please view results for these tests on the individual orders.    Lavender Top [[id number]] Collected:  08/16/17 0725    Specimen:  Blood Updated:  08/16/17 0901     Extra Tube hold for add-on      Auto resulted       Extra Tubes [[id number]] Collected:  08/16/17 0725    Specimen:  Blood from Blood, Venous Line Updated:  08/16/17 0901    Narrative:       The following orders were created for panel order Extra Tubes.  Procedure                               Abnormality         Status                     ---------                               -----------         ------                     Gold Top - SST[[id number]]                                   Final result                 Please view results for these tests on the individual orders.    Gold Top - SST [[id number]] Collected:  08/16/17 0725    Specimen:  Blood Updated:  08/16/17 0901     Extra Tube Hold for add-ons.      Auto resulted.       Comprehensive Metabolic Panel [[id number]]  (Abnormal) Collected:  08/16/17 0723    Specimen:  Blood Updated:  08/16/17 0903     Glucose 102 " (H) mg/dL      BUN 9 mg/dL      Creatinine 0.91 mg/dL      Sodium 138 mmol/L      Potassium 4.0 mmol/L      Chloride 102 mmol/L      CO2 25.0 mmol/L      Calcium 8.6 mg/dL      Total Protein 6.3 g/dL      Albumin 3.60 g/dL      ALT (SGPT) 63 U/L      AST (SGOT) 58 U/L      Alkaline Phosphatase 80 U/L      Total Bilirubin 0.7 mg/dL      eGFR Non African Amer 97 mL/min/1.73      Globulin 2.7 gm/dL      A/G Ratio 1.3 g/dL      BUN/Creatinine Ratio 9.9     Anion Gap 11.0 mmol/L             Physical exam: Alert and appropriate nonicteric abdomen is soft incision is clean and intact JHONNY is serosanguineous    Assessment :  Postop day 2 from a open cholecystectomy for acute and chronic cholecystitis.  Patient is doing well.    Plan:  Discharge to home today.  Remove drain.  Patient to follow up with us in the office in one week.  Patient can continue any of his home medications that he is on and he's been given 25 Norco 7.5 tablets to be used on a when necessary basis for pain    Allergies: No Known Allergies    Medications:   Current Facility-Administered Medications   Medication Dose Route Frequency Provider Last Rate Last Dose   • bupivacaine-EPINEPHrine PF (MARCAINE w/EPI) 0.5% -[id number] injection    PRN Ace Parada MD   30 mL at 08/15/17 1453   • dextrose 5 % and sodium chloride 0.45 % infusion  100 mL/hr Intravenous Continuous Ace Parada MD   Stopped at 08/15/17 1511   • dextrose 5 % and sodium chloride 0.45 % with KCl 20 mEq/L infusion  125 mL/hr Intravenous Continuous Ace Parada MD   Stopped at 08/15/17 0820   • dextrose 5 % and sodium chloride 0.45 % with KCl 20 mEq/L infusion  100 mL/hr Intravenous Continuous Ace Parada  mL/hr at 08/17/17 0522 100 mL/hr at 08/17/17 0522   • HYDROcodone-acetaminophen (NORCO) 7.5-325 MG per tablet 1 tablet  1 tablet Oral Q4H PRN Ace Parada MD   1 tablet at 08/17/17 0204   • HYDROmorphone (DILAUDID) injection 1 mg  1 mg Intravenous Q3H PRN Corey  JEFFERY Carvalho MD   1 mg at 08/16/17 0804   • iopamidol (ISOVUE-300) 61 % injection    PRN Ace Parada MD   0.0001 mL at 08/15/17 1454   • naloxone (NARCAN) injection 0.4 mg  0.4 mg Intravenous Q5 Min PRN Ace Parada MD       • ondansetron (ZOFRAN) injection 4 mg  4 mg Intravenous Q4H PRN Corey Carvalho MD   4 mg at 08/16/17 0158   • sodium chloride (BACTERIOSTATIC) injection    PRN Ace Parada MD   0.0001 mL at 08/15/17 1454   • sodium chloride 0.9 % flush 10 mL  10 mL Intravenous PRN Enrique Engel MD       • sodium chloride 0.9 % infusion  125 mL/hr Intravenous Continuous Rajesh Mayo MD   Stopped at 08/15/17 0500       Social History     Social History   • Marital status:      Spouse name: N/A   • Number of children: N/A   • Years of education: N/A     Occupational History   • Not on file.     Social History Main Topics   • Smoking status: Never Smoker   • Smokeless tobacco: Never Used   • Alcohol use No   • Drug use: No   • Sexual activity: Yes     Partners: Female     Other Topics Concern   • Not on file     Social History Narrative       Past Medical History:   Diagnosis Date   • Astigmatism    • Bone disease    • Chronic pain    • Fibroma of lower extremity    • Myopia        Family History   Problem Relation Age of Onset   • Thyroid disease Mother        Past Surgical History:   Procedure Laterality Date   • CHOLECYSTECTOMY WITH INTRAOPERATIVE CHOLANGIOGRAM N/A 8/15/2017    Procedure: CHOLECYSTECTOMY LAPAROSCOPIC INTRAOPERATIVE CHOLANGIOGRAM;  Surgeon: Ace Parada MD;  Location: [facility];  Service:    • LEG SURGERY Right 06/07/2017                                                           EMR Dragon/Transcription disclaimer:  Much of this encounter note is on electronic transcription/translation of spoken language to printed text.  The electronic translation of spoken language may permit erroneous or at times, nonsensical words or phrases to be inadvertently transcribed;   Although I have reviewed the note for such errors, some may still exist.                                                                              EMR Dragon/Transcription disclaimer:  Much of this encounter note is on electronic transcription/translation of spoken language to printed text.  The electronic translation of spoken language may permit erroneous or at times, nonsensical words or phrases to be inadvertently transcribed;  Although I have reviewed the note for such errors, some may still exist.

## 2017-08-23 ENCOUNTER — CONSULT (OUTPATIENT)
Dept: SLEEP MEDICINE | Facility: HOSPITAL | Age: 32
End: 2017-08-23

## 2017-08-23 VITALS
SYSTOLIC BLOOD PRESSURE: 110 MMHG | DIASTOLIC BLOOD PRESSURE: 62 MMHG | BODY MASS INDEX: 24.49 KG/M2 | OXYGEN SATURATION: 98 % | WEIGHT: 147 LBS | HEART RATE: 84 BPM | HEIGHT: 65 IN

## 2017-08-23 DIAGNOSIS — G47.33 OBSTRUCTIVE SLEEP APNEA, ADULT: Primary | ICD-10-CM

## 2017-08-23 PROCEDURE — 99202 OFFICE O/P NEW SF 15 MIN: CPT | Performed by: INTERNAL MEDICINE

## 2017-08-23 NOTE — PROGRESS NOTES
"New Patient Sleep Medicine Consultation    Encounter Date: 8/23/2017         Patient's PCP: Simran Rosas MD  Referring provider: Simran Rosas*  Reason for consultation: snoring, witnessed apneas, excessive daytime sleepiness and unrefreshing sleep    Giacomo Yanez is a 31 y.o. male who presents with above complaints for many years. He  admits to daytime fatigue, and non-restorative sleep. His bedtime is ~ 2230. He  falls asleep after 5 minutes, and is up 2-3 times per night. He wakes up ~ 0700. He drinks 0 cups of coffee, 1 gallon teas, and Sprite per day. He drinks no alcoholic beverages per week. He does not smoke. He denies abnormal dreams, cataplexy, sleep paralysis, or hypnagogic hallucinations. He does not take sedatives or hypnotics. He has no sleepiness with driving. He naps daily from ~4789-2751    [place] - 17    Past Medical History:   Diagnosis Date   • Astigmatism    • Bone disease    • Chronic pain    • Fibroma of lower extremity    • Myopia      Social History     Social History   • Marital status:      Spouse name: N/A   • Number of children: N/A   • Years of education: N/A     Occupational History   • Not on file.     Social History Main Topics   • Smoking status: Never Smoker   • Smokeless tobacco: Never Used   • Alcohol use No   • Drug use: No   • Sexual activity: Yes     Partners: Female     Other Topics Concern   • Not on file     Social History Narrative     Family History   Problem Relation Age of Onset   • Thyroid disease Mother        Smoking history: smoked never  FH of sleep disorders: none    Review of Systems:  + diarrhea from recent gallbladder surgery  Patient advised to discuss any positive ROS with PCP.      Vitals:    08/23/17 1416   BP: 110/62   Pulse: 84   SpO2: 98%     Body mass index is 24.46 kg/(m^2).    Body mass index is 24.46 kg/(m^2).  Neck circumference: 14.75\"            General: Alert. Cooperative. Well developed. No acute distress.        "      Head:  Normocephalic. Symmetrical. Atraumatic. Mildly hypoplastic mandible             Eyes: Sclera clear. No icterus. PERRLA. Normal EOM.             Ears: No deformities. Normal hearing.             Nose: No septal deviation. No drainage.          Throat: No oral lesions. No thrush. Moist mucous membranes.    Tongue is normal    Dentition is poor       Pharynx: Posterior pharyngeal pillars are wide, tonsils are visualized and 2-3+    Mallampati score of IV (only hard palate visible)    Pharynx is nonerythematous   Chest Wall:  Normal shape. Symmetric expansion with respiration. No tenderness.             Neck:  Trachea midline.           Lungs:  Clear to auscultation bilaterally. No wheezes. No rhonchi. No rales. Respirations regular, even and unlabored.            Heart:  Regular rhythm and normal rate. Normal S1 and S2. No murmur.     Abdomen:  Soft, non-tender and non-distended. Normal bowel sounds. No masses.  Extremities:  Moves all extremities well. No edema. [place] limbs          Pulses: Pulses palpable and equal bilaterally.               Skin: Dry. Intact. No bleeding. No rash.           Neuro: Moves all 4 extremities and cranial nerves grossly intact.  Psychiatric: Normal mood and affect.      Current Outpatient Prescriptions:   •  acetic acid-hydrocortisone (VOSOL-HC) 1-2 % otic solution, Administer 4 drops into ears 2 (Two) Times a Week., Disp: 10 mL, Rfl: 11  •  albuterol (PROVENTIL HFA;VENTOLIN HFA) 108 (90 BASE) MCG/ACT inhaler, Inhale 2 puffs Every 6 (Six) Hours As Needed for wheezing., Disp: 1 inhaler, Rfl: 2  •  HYDROcodone-acetaminophen (NORCO) 7.5-325 MG per tablet, Take 1 tablet by mouth Every 6 (Six) Hours As Needed for Moderate Pain  (Pain)., Disp: 30 tablet, Rfl: 0    ASSESSMENT:  1. Obstructive sleep apnea presumed  2. Caffeine excess - recommend reducing caffeine intake over time to no more than (3) caffeine beverages per day, all before 4pm. Advised to reduce caffeine  slowly.  3. RTC if neg, if (+) order CPAP machine.       This document has been electronically signed by Manjit Srinivasan MD on August 23, 2017         CC: MD Alison Kan, Simran Burroughs*

## 2017-08-25 ENCOUNTER — OFFICE VISIT (OUTPATIENT)
Dept: SURGERY | Facility: CLINIC | Age: 32
End: 2017-08-25

## 2017-08-25 VITALS
SYSTOLIC BLOOD PRESSURE: 130 MMHG | HEIGHT: 65 IN | DIASTOLIC BLOOD PRESSURE: 60 MMHG | WEIGHT: 150 LBS | BODY MASS INDEX: 24.99 KG/M2

## 2017-08-25 DIAGNOSIS — K81.9 CHOLECYSTITIS: Primary | ICD-10-CM

## 2017-08-25 PROCEDURE — 99024 POSTOP FOLLOW-UP VISIT: CPT | Performed by: SURGERY

## 2017-08-25 NOTE — PROGRESS NOTES
31-year-old male now 10 days out from a open subtotal cholecystectomy for chronic cholecystitis and cholelithiasis.  Went over the pathology with the patient and his wife.  Abdomen is soft he is nonicteric His incisions are all clean appear to be healing nicely.  We removed the staples and place Steri-Strips.  Patient does have some diarrhea and we recommended use a fiber supplement on a daily basis and stay well-hydrated.  Patient will follow up with us in 2 weeks or sooner if he has a further concerns or questions

## 2017-09-07 ENCOUNTER — HOSPITAL ENCOUNTER (OUTPATIENT)
Dept: SLEEP MEDICINE | Facility: HOSPITAL | Age: 32
Discharge: HOME | End: 2017-09-07
Admitting: INTERNAL MEDICINE

## 2017-09-07 DIAGNOSIS — G47.33 OBSTRUCTIVE SLEEP APNEA, ADULT: ICD-10-CM

## 2017-09-07 PROCEDURE — 95806 SLEEP STUDY UNATT&RESP EFFT: CPT | Performed by: INTERNAL MEDICINE

## 2017-09-07 PROCEDURE — 95806 SLEEP STUDY UNATT&RESP EFFT: CPT

## 2017-09-11 ENCOUNTER — OFFICE VISIT (OUTPATIENT)
Dept: SURGERY | Facility: CLINIC | Age: 32
End: 2017-09-11

## 2017-09-11 VITALS
DIASTOLIC BLOOD PRESSURE: 70 MMHG | HEIGHT: 65 IN | WEIGHT: 148 LBS | SYSTOLIC BLOOD PRESSURE: 110 MMHG | BODY MASS INDEX: 24.66 KG/M2

## 2017-09-11 DIAGNOSIS — K80.00 CALCULUS OF GALLBLADDER WITH ACUTE CHOLECYSTITIS WITHOUT OBSTRUCTION: Primary | ICD-10-CM

## 2017-09-11 PROCEDURE — 99024 POSTOP FOLLOW-UP VISIT: CPT | Performed by: SURGERY

## 2017-09-11 NOTE — PROGRESS NOTES
Patient is now nearly 4 weeks out from his subtotal cholecystectomy for chronic cholecystitis and cholelithiasis.  Overall patient feels much better.  Denies any pain.  He is eating a regular diet and does have full return of bowel function.  His mild underlying diarrhea seems to be worse since he had his gallbladder removed.  He is also noticed occasional blood in his stool and on toilet paper.  Denies any abdominal pain.  An overall he feels much better    Incisions are all clean and healing nicely.  He is nonicteric his abdomen is soft    Recommend he use a trial of fiber for the next 4-6 weeks and follow up with us after that.  We discussed the use of fiber to help with his diarrhea.  Patient continues to have any bleeding and he may need to have a colonoscopy at some point.  Most likely this diarrhea is related to his recent cholecystectomy.  Patient and his wife understand and all questions been answered.

## 2017-09-13 DIAGNOSIS — G47.33 OSA (OBSTRUCTIVE SLEEP APNEA): Primary | ICD-10-CM

## 2021-04-27 NOTE — PLAN OF CARE
"Problem: Patient Care Overview (Adult)  Goal: Plan of Care Review  Outcome: Ongoing (interventions implemented as appropriate)    08/16/17 1350   Coping/Psychosocial Response Interventions   Plan Of Care Reviewed With patient;spouse   Patient Care Overview   Progress improving   Outcome Evaluation   Outcome Summary/Follow up Plan VSS tolerating regular diet. pills and ambulating help to keep pain controlled       Goal: Adult Individualization and Mutuality  Outcome: Ongoing (interventions implemented as appropriate)    08/16/17 1350   Individualization   Patient Specific Interventions encouraged patient to walk when he states \"abd spasms\" pt experiencing gas pain and walking makes it better       Goal: Discharge Needs Assessment  Outcome: Ongoing (interventions implemented as appropriate)    08/16/17 1350   Discharge Needs Assessment   Concerns To Be Addressed no discharge needs identified         Problem: Cholecystitis/Cholecystectomy (Adult)  Goal: Signs and Symptoms of Listed Potential Problems Will be Absent or Manageable (Cholecystitis/Cholecystectomy)  Outcome: Ongoing (interventions implemented as appropriate)    08/16/17 1350   Cholecystitis/Cholecystectomy   Problems Assessed (Cholecystitis/Cholecystectomy) all   Problems Present (Cholecystitis/Cholecystectomy) pain           "
Problem: Patient Care Overview (Adult)  Goal: Discharge Needs Assessment    08/15/17 1732   Discharge Needs Assessment   Concerns To Be Addressed no discharge needs identified         Problem: Cholecystitis/Cholecystectomy (Adult)  Goal: Signs and Symptoms of Listed Potential Problems Will be Absent or Manageable (Cholecystitis/Cholecystectomy)  Outcome: Ongoing (interventions implemented as appropriate)    08/15/17 1732   Cholecystitis/Cholecystectomy   Problems Assessed (Cholecystitis/Cholecystectomy) all   Problems Present (Cholecystitis/Cholecystectomy) pain         Problem: Perioperative Period (Adult)  Goal: Signs and Symptoms of Listed Potential Problems Will be Absent or Manageable (Perioperative Period)  Outcome: Ongoing (interventions implemented as appropriate)    08/15/17 1732   Perioperative Period   Problems Assessed (Perioperative Period) all   Problems Present (Perioperative Period) pain           
Problem: Patient Care Overview (Adult)  Goal: Plan of Care Review  Outcome: Ongoing (interventions implemented as appropriate)    08/15/17 1625   Coping/Psychosocial Response Interventions   Plan Of Care Reviewed With patient   Patient Care Overview   Progress improving   Outcome Evaluation   Outcome Summary/Follow up Plan Once all criteria met, patient ready for transfer back to floor         Problem: Perioperative Period (Adult)  Goal: Signs and Symptoms of Listed Potential Problems Will be Absent or Manageable (Perioperative Period)  Outcome: Ongoing (interventions implemented as appropriate)    08/15/17 1625   Perioperative Period   Problems Assessed (Perioperative Period) all   Problems Present (Perioperative Period) none           
Problem: Patient Care Overview (Adult)  Goal: Plan of Care Review  Outcome: Ongoing (interventions implemented as appropriate)    08/16/17 0241   Coping/Psychosocial Response Interventions   Plan Of Care Reviewed With patient   Patient Care Overview   Progress no change   Outcome Evaluation   Outcome Summary/Follow up Plan vss, had ambulated well and been up to chair, some n/v but always after IV pain meds so going to try oral with zofran       Goal: Adult Individualization and Mutuality  Outcome: Ongoing (interventions implemented as appropriate)  Goal: Discharge Needs Assessment  Outcome: Ongoing (interventions implemented as appropriate)    Problem: Cholecystitis/Cholecystectomy (Adult)  Goal: Signs and Symptoms of Listed Potential Problems Will be Absent or Manageable (Cholecystitis/Cholecystectomy)  Outcome: Ongoing (interventions implemented as appropriate)    Problem: Perioperative Period (Adult)  Goal: Signs and Symptoms of Listed Potential Problems Will be Absent or Manageable (Perioperative Period)  Outcome: Outcome(s) achieved Date Met:  08/16/17      
Problem: Patient Care Overview (Adult)  Goal: Plan of Care Review  Outcome: Ongoing (interventions implemented as appropriate)    08/17/17 0348   Coping/Psychosocial Response Interventions   Plan Of Care Reviewed With patient   Patient Care Overview   Progress improving   Outcome Evaluation   Outcome Summary/Follow up Plan VSS, tolerating regular diet. Ambulation helps with pain control.        Goal: Adult Individualization and Mutuality  Outcome: Ongoing (interventions implemented as appropriate)    08/17/17 0348   Individualization   Patient Specific Interventions Continue encouraging ambulation.        Goal: Discharge Needs Assessment  Outcome: Ongoing (interventions implemented as appropriate)    08/16/17 1350   Discharge Needs Assessment   Concerns To Be Addressed no discharge needs identified         Problem: Cholecystitis/Cholecystectomy (Adult)  Goal: Signs and Symptoms of Listed Potential Problems Will be Absent or Manageable (Cholecystitis/Cholecystectomy)  Outcome: Ongoing (interventions implemented as appropriate)    08/17/17 0348   Cholecystitis/Cholecystectomy   Problems Assessed (Cholecystitis/Cholecystectomy) all   Problems Present (Cholecystitis/Cholecystectomy) pain           
CHEST PAIN

## 2022-11-03 ENCOUNTER — HOSPITAL ENCOUNTER (OUTPATIENT)
Dept: ULTRASOUND IMAGING | Facility: HOSPITAL | Age: 37
End: 2022-11-03

## 2022-11-10 ENCOUNTER — TELEPHONE (OUTPATIENT)
Dept: GENERAL RADIOLOGY | Facility: HOSPITAL | Age: 37
End: 2022-11-10

## 2023-04-20 ENCOUNTER — TRANSCRIBE ORDERS (OUTPATIENT)
Dept: ORTHOPEDIC SURGERY | Facility: CLINIC | Age: 38
End: 2023-04-20
Payer: MEDICARE

## 2023-04-20 DIAGNOSIS — M79.605 BILATERAL LEG PAIN: Primary | ICD-10-CM

## 2023-04-20 DIAGNOSIS — M79.604 BILATERAL LEG PAIN: Primary | ICD-10-CM

## (undated) DEVICE — SYR LUERLOK 20CC

## (undated) DEVICE — SYR 10ML

## (undated) DEVICE — SPNG LAP 18X18IN LF STRL PK/5

## (undated) DEVICE — ELECTRD BLD STD SS 3/32X6.5IN

## (undated) DEVICE — GLV SURG TRIUMPH LT PF LTX 7 STRL

## (undated) DEVICE — NDL HYPO PRECISIONGLIDE/REG 18G 1IN PNK

## (undated) DEVICE — LUER-LOK 360°: Brand: CONNECTA, LUER-LOK

## (undated) DEVICE — GLV SURG TRIUMPH LT PF LTX 7.5 STRL

## (undated) DEVICE — GLV SURG SENSICARE GREEN W/ALOE PF LF 6 STRL

## (undated) DEVICE — THE KUMAR CATHETER®, USED IN CONJUNCTION WITH KUMAR CHOLANGIOGRAPHY® CLAMP, IS MEANT TO PROVIDE A MEANS OF LAPAROSCOPIC CHOLANGIOGRAPHY. IT COMPRISES A TRANSLUCENT TUBING ( 76 CM. LENGTH AND 16 GA. ) THAT CARRIES A 19 GA., 1.25 CM LONG NEEDLE AT THE END. THE KUMAR CATHETER® IS USED TO PUNCTURE THE HARTMANN'S POUCH OF THE GALLBLADDER FOR BILIARY ACCESS AND / OR ASPIRATION. PRODUCT IS LATEX FREE.: Brand: KUMAR CATHETER®

## (undated) DEVICE — GLV SURG SENSICARE GREEN W/ALOE PF LF 7 STRL

## (undated) DEVICE — SUT MONOCRYL 4/0 PS2 27IN Y426H ETY426H

## (undated) DEVICE — PK LAP CHOLE LF 60

## (undated) DEVICE — MONOPOLAR METZENBAUM SCISSOR TIP, DISPOSABLE: Brand: MONOPOLAR METZENBAUM SCISSOR TIP, DISPOSABLE

## (undated) DEVICE — SUT VIC 2/0 TIES 18IN J111T

## (undated) DEVICE — PDS II VLT 0 107CM AG ST3: Brand: ENDOLOOP

## (undated) DEVICE — SPNG GZ WOVN 4X4IN 12PLY 10/BX STRL

## (undated) DEVICE — GLV SURG TRIUMPH LT PF LTX 6.5 STRL

## (undated) DEVICE — STPLR SKIN VISISTAT WD 35CT

## (undated) DEVICE — HARMONIC ACE +7 LAPAROSCOPIC SHEARS ADVANCED HEMOSTASIS 5MM DIAMETER 36CM SHAFT LENGTH  FOR USE WITH GRAY HAND PIECE ONLY: Brand: HARMONIC ACE

## (undated) DEVICE — APPL CHLORAPREP W/TINT 26ML ORNG

## (undated) DEVICE — SUT SILK 2/0 FS 18IN 685H

## (undated) DEVICE — ENDOPATH XCEL DILATING TIP TROCARS WITH STABILITY SLEEVES: Brand: ENDOPATH XCEL

## (undated) DEVICE — RESERVOIR,SUCTION,100CC,SILICONE: Brand: MEDLINE

## (undated) DEVICE — SUT VIC 2/0 UR6 27IN VCP602H

## (undated) DEVICE — SUT VIC 2/0 SH 27IN

## (undated) DEVICE — DRN WND FLT 90D HUBLSS FULL TROC 10MM LF

## (undated) DEVICE — GOWN,PREVENTION PLUS,XLNG/XXLARGE,STRL: Brand: MEDLINE

## (undated) DEVICE — GLV SURG TRIUMPH LT PF LTX 6 STRL

## (undated) DEVICE — SOL IRRIG NACL 1000ML

## (undated) DEVICE — BLUNT TIP TROCAR: Brand: AUTO SUTURE

## (undated) DEVICE — SUT PDS 1 XLH LP 99IN Z881G